# Patient Record
Sex: FEMALE | Race: WHITE | NOT HISPANIC OR LATINO | Employment: FULL TIME | URBAN - METROPOLITAN AREA
[De-identification: names, ages, dates, MRNs, and addresses within clinical notes are randomized per-mention and may not be internally consistent; named-entity substitution may affect disease eponyms.]

---

## 2019-04-16 ENCOUNTER — TRANSFERRED RECORDS (OUTPATIENT)
Dept: HEALTH INFORMATION MANAGEMENT | Facility: CLINIC | Age: 59
End: 2019-04-16

## 2021-01-25 ENCOUNTER — OFFICE VISIT (OUTPATIENT)
Dept: FAMILY MEDICINE | Facility: CLINIC | Age: 61
End: 2021-01-25
Payer: COMMERCIAL

## 2021-01-25 VITALS
DIASTOLIC BLOOD PRESSURE: 78 MMHG | BODY MASS INDEX: 29.95 KG/M2 | SYSTOLIC BLOOD PRESSURE: 115 MMHG | HEART RATE: 78 BPM | WEIGHT: 169 LBS | OXYGEN SATURATION: 93 % | HEIGHT: 63 IN

## 2021-01-25 DIAGNOSIS — Z13.1 SCREENING FOR DIABETES MELLITUS: ICD-10-CM

## 2021-01-25 DIAGNOSIS — E03.9 ACQUIRED HYPOTHYROIDISM: Primary | ICD-10-CM

## 2021-01-25 DIAGNOSIS — F51.04 PSYCHOPHYSIOLOGICAL INSOMNIA: ICD-10-CM

## 2021-01-25 DIAGNOSIS — Z12.31 ENCOUNTER FOR SCREENING MAMMOGRAM FOR BREAST CANCER: ICD-10-CM

## 2021-01-25 DIAGNOSIS — Z13.220 SCREENING FOR LIPID DISORDERS: ICD-10-CM

## 2021-01-25 DIAGNOSIS — F33.1 MODERATE EPISODE OF RECURRENT MAJOR DEPRESSIVE DISORDER (H): ICD-10-CM

## 2021-01-25 DIAGNOSIS — Z12.11 SCREEN FOR COLON CANCER: ICD-10-CM

## 2021-01-25 DIAGNOSIS — F41.1 ANXIETY STATE: ICD-10-CM

## 2021-01-25 DIAGNOSIS — R60.0 LOWER EXTREMITY EDEMA: ICD-10-CM

## 2021-01-25 DIAGNOSIS — E55.9 VITAMIN D INSUFFICIENCY: ICD-10-CM

## 2021-01-25 PROCEDURE — 99204 OFFICE O/P NEW MOD 45 MIN: CPT | Performed by: NURSE PRACTITIONER

## 2021-01-25 RX ORDER — SENNOSIDES A AND B 8.6 MG/1
1 TABLET, FILM COATED ORAL DAILY
COMMUNITY
Start: 2020-05-20 | End: 2021-01-25

## 2021-01-25 RX ORDER — LEVOTHYROXINE SODIUM 50 UG/1
50 TABLET ORAL DAILY
Qty: 90 TABLET | Refills: 0 | Status: SHIPPED | OUTPATIENT
Start: 2021-01-25 | End: 2021-04-28

## 2021-01-25 RX ORDER — TRAZODONE HYDROCHLORIDE 150 MG/1
1 TABLET ORAL AT BEDTIME
COMMUNITY
Start: 2020-08-17 | End: 2021-01-25

## 2021-01-25 RX ORDER — PAROXETINE 20 MG/1
20 TABLET, FILM COATED ORAL DAILY
Qty: 90 TABLET | Refills: 0 | Status: SHIPPED | OUTPATIENT
Start: 2021-01-25 | End: 2021-04-28

## 2021-01-25 RX ORDER — PAROXETINE 20 MG/1
1 TABLET, FILM COATED ORAL DAILY
COMMUNITY
Start: 2020-08-17 | End: 2021-01-25

## 2021-01-25 RX ORDER — TRAZODONE HYDROCHLORIDE 150 MG/1
150 TABLET ORAL AT BEDTIME
Qty: 90 TABLET | Refills: 0 | Status: SHIPPED | OUTPATIENT
Start: 2021-01-25 | End: 2021-04-28

## 2021-01-25 RX ORDER — LEVOTHYROXINE SODIUM 50 UG/1
1 TABLET ORAL DAILY
COMMUNITY
Start: 2020-11-18 | End: 2021-01-25

## 2021-01-25 ASSESSMENT — PATIENT HEALTH QUESTIONNAIRE - PHQ9: SUM OF ALL RESPONSES TO PHQ QUESTIONS 1-9: 9

## 2021-01-25 ASSESSMENT — MIFFLIN-ST. JEOR: SCORE: 1302.77

## 2021-01-25 NOTE — PATIENT INSTRUCTIONS
Refills sent on your medications today.     Please return in the next month for a physical with labs.  Try to schedule the lab visit 1-2 days before your appointment.     For lower extremity edema-    Cut back on salt in your diet.   Elevate your legs when you are sitting.   Can try knee-high compression stockings, can be purchased online or at the drug store 20-30 mmHg strength.    Some of this could be related to thyroid- we will get you back on medication and check labs next month.   If still having issues, we could do further evaluation such as an ultrasound of your heart.

## 2021-01-25 NOTE — PROGRESS NOTES
Assessment & Plan       Acquired hypothyroidism  Has been out of medication for 3 weeks, per patient.   Restart at previous dosing- 50 mcg daily.   Return in 1 month for physical, check labs at that time.   - levothyroxine (SYNTHROID/LEVOTHROID) 50 MCG tablet; Take 1 tablet (50 mcg) by mouth daily  - TSH; Future    Moderate episode of recurrent major depressive disorder (H)  Chronic, stable. Continue Paxil 20 mg.   - PARoxetine (PAXIL) 20 MG tablet; Take 1 tablet (20 mg) by mouth daily    Anxiety state  Chronic, stable. Continue Paxil 20 mg.   - PARoxetine (PAXIL) 20 MG tablet; Take 1 tablet (20 mg) by mouth daily    Psychophysiological insomnia  Chronic, stable. Continue Trazodone 150 mg.   - traZODone (DESYREL) 150 MG tablet; Take 1 tablet (150 mg) by mouth At Bedtime    Vitamin D insufficiency  History of deficiency per patient, no vitamin d level on file   - cholecalciferol 50 MCG (2000 UT) tablet; Take 1 tablet (50 mcg) by mouth daily    Lower extremity edema  Mild, no associated symptoms  No hx of CHF or liver disease  Has been out of levothyroxine, but states symptoms started prior  Discussed watching salt in diet, elevating legs, compression stockings.   -Consider further work-up if no improvement with above measures  - Comprehensive metabolic panel; Future  - TSH; Future    Screening for diabetes mellitus  - Comprehensive metabolic panel; Future    Screening for lipid disorders  - Lipid panel reflex to direct LDL Fasting; Future    Screen for colon cancer  - GASTROENTEROLOGY ADULT REF PROCEDURE ONLY; Future    Encounter for screening mammogram for breast cancer  - MA SCREENING DIGITAL BILAT - Future  (s+30); Future    Review of prior external note(s) from - CareEverywhere information from [facility]  reviewed  Review of the result(s) of each unique test - Outside labs- [facility]     Tobacco Cessation:   reports that she has been smoking cigarettes. She has a 0.75 pack-year smoking history. She  "has never used smokeless tobacco.  Tobacco Cessation Action Plan: Information offered: Patient not interested at this time    BMI:   Estimated body mass index is 30.42 kg/m  as calculated from the following:    Height as of this encounter: 1.588 m (5' 2.5\").    Weight as of this encounter: 76.7 kg (169 lb).   Weight management plan: Discussed healthy diet and exercise guidelines      See Patient Instructions  Patient Instructions   Refills sent on your medications today.     Please return in the next month for a physical with labs.  Try to schedule the lab visit 1-2 days before your appointment.     For lower extremity edema-    Cut back on salt in your diet.   Elevate your legs when you are sitting.   Can try knee-high compression stockings, can be purchased online or at the drug store 20-30 mmHg strength.    Some of this could be related to thyroid- we will get you back on medication and check labs next month.   If still having issues, we could do further evaluation such as an ultrasound of your heart.         Return in about 2 weeks (around 2/8/2021) for Physical Exam, Lab Work.    Ena Cohen, [facility]    Raysa Leary is a 60 year old who presents to clinic today for the following health issues:    HPI       New to clinic.  Needs medication refills.     Hypothyroid- Taking levothyroxine 50 mcg daily.  Ran out of medication about 3 weeks ago.  Last TSH on file is with [facility] 4/16/19= 2.68.    Depression/anxiety- well controlled on Paxil 20 mg daily.     Insomnia- well controlled on trazodone 150 mg.     Lower extremity edema- for the last few months.  Bilateral.  No pain, redness, warmth.  No SOB or CP.  No weight gain she is aware of.  No PND or orthopnea.  No hx of CHF/liver disease.         Review of Systems    ROS: 10 point ROS neg other than the symptoms noted above in the HPI.        Objective    /78   Pulse 78   Ht 1.588 m (5' 2.5\")   Wt 76.7 kg " (169 lb)   LMP 10/15/2007   SpO2 93%   BMI 30.42 kg/m    Body mass index is 30.42 kg/m .  Physical Exam   GENERAL: healthy, alert and no distress  EYES: Eyes grossly normal to inspection, PERRL and conjunctivae and sclerae normal  HENT: ear canals and TM's normal, nose and mouth without ulcers or lesions  NECK: no adenopathy, no asymmetry, masses, or scars and thyroid normal to palpation  RESP: lungs clear to auscultation - no rales, rhonchi or wheezes  CV: regular rate and rhythm, normal S1 S2, no S3 or S4, no murmur, click or rub, no peripheral edema and peripheral pulses strong  ABDOMEN: soft, nontender, no hepatosplenomegaly, no masses and bowel sounds normal  MS: no gross musculoskeletal defects noted, mild edema to BLE  SKIN: no suspicious lesions or rashes  NEURO: Normal strength and tone, mentation intact and speech normal  PSYCH: mentation appears normal, affect normal/bright    Labs reviewed in Care Everywhere

## 2021-04-28 DIAGNOSIS — F51.04 PSYCHOPHYSIOLOGICAL INSOMNIA: ICD-10-CM

## 2021-04-28 DIAGNOSIS — F33.1 MODERATE EPISODE OF RECURRENT MAJOR DEPRESSIVE DISORDER (H): ICD-10-CM

## 2021-04-28 DIAGNOSIS — F41.1 ANXIETY STATE: ICD-10-CM

## 2021-04-28 DIAGNOSIS — E03.9 ACQUIRED HYPOTHYROIDISM: ICD-10-CM

## 2021-04-28 RX ORDER — PAROXETINE 20 MG/1
TABLET, FILM COATED ORAL
Qty: 30 TABLET | Refills: 0 | Status: SHIPPED | OUTPATIENT
Start: 2021-04-28 | End: 2021-05-25

## 2021-04-28 RX ORDER — TRAZODONE HYDROCHLORIDE 150 MG/1
TABLET ORAL
Qty: 90 TABLET | Refills: 2 | Status: SHIPPED | OUTPATIENT
Start: 2021-04-28 | End: 2021-05-25

## 2021-04-28 RX ORDER — LEVOTHYROXINE SODIUM 50 UG/1
TABLET ORAL
Qty: 30 TABLET | Refills: 0 | Status: SHIPPED | OUTPATIENT
Start: 2021-04-28 | End: 2021-05-27

## 2021-04-28 NOTE — LETTER
April 28, 2021    Sapphire Vivar  [address]  [facility] [address]    Dear Sapphire,       We recently received a refill request for levothyroxine (SYNTHROID/LEVOTHROID) 50 MCG tablet, PARoxetine (PAXIL) 20 MG tablet, and traZODone (DESYREL) 150 MG tablet.  We have refilled this for a one time 30 day supply only because you are due for a:    physical office visit and fasting lab appointment      Please schedule this lab appointment 4-5 days prior to the office visit.     Please call at your earliest convenience so that there will not be a delay with your future refills.          Thank you,   Your [facility] Team/sp  [phone]

## 2021-04-28 NOTE — TELEPHONE ENCOUNTER
Refilled both for 30 days, needs physical with labs.  No further refills until scheduled. Ena Cohen, CNP

## 2021-04-28 NOTE — TELEPHONE ENCOUNTER
"Requested Prescriptions   Pending Prescriptions Disp Refills    levothyroxine (SYNTHROID/LEVOTHROID) 50 MCG tablet [Pharmacy Med Name: Levothyroxine Sodium Oral Tablet 50 MCG] 90 tablet 0     Sig: TAKE ONE TABLET BY MOUTH ONE TIME DAILY       Thyroid Protocol Failed - 4/28/2021  3:21 PM        Failed - Normal TSH on file in past 12 months     No lab results found.           Passed - Patient is 12 years or older        Passed - Recent (12 mo) or future (30 days) visit within the authorizing provider's specialty     Patient has had an office visit with the authorizing provider or a provider within the authorizing providers department within the previous 12 mos or has a future within next 30 days. See \"Patient Info\" tab in inbasket, or \"Choose Columns\" in Meds & Orders section of the refill encounter.              Passed - Medication is active on med list        Passed - No active pregnancy on record     If patient is pregnant or has had a positive pregnancy test, please check TSH.          Passed - No positive pregnancy test in past 12 months     If patient is pregnant or has had a positive pregnancy test, please check TSH.            PARoxetine (PAXIL) 20 MG tablet [Pharmacy Med Name: PARoxetine HCl Oral Tablet 20 MG] 90 tablet 0     Sig: TAKE ONE TABLET BY MOUTH ONE TIME DAILY       SSRIs Protocol Failed - 4/28/2021  3:21 PM        Failed - PHQ-9 score less than 5 in past 6 months     Please review last PHQ-9 score.           Passed - Medication is active on med list        Passed - Patient is age 18 or older        Passed - No active pregnancy on record        Passed - No positive pregnancy test in last 12 months        Passed - Recent (6 mo) or future (30 days) visit within the authorizing provider's specialty     Patient had office visit in the last 6 months or has a visit in the next 30 days with authorizing provider or within the authorizing provider's specialty.  See \"Patient Info\" tab in inbasket, or \"Choose " "Columns\" in Meds & Orders section of the refill encounter.             Signed Prescriptions Disp Refills    traZODone (DESYREL) 150 MG tablet 90 tablet 2     Sig: TAKE ONE TABLET BY MOUTH AT BEDTIME       Serotonin Modulators Passed - 4/28/2021  3:21 PM        Passed - Recent (12 mo) or future (30 days) visit within the authorizing provider's specialty     Patient has had an office visit with the authorizing provider or a provider within the authorizing providers department within the previous 12 mos or has a future within next 30 days. See \"Patient Info\" tab in inbasket, or \"Choose Columns\" in Meds & Orders section of the refill encounter.              Passed - Medication is active on med list        Passed - Patient is age 18 or older        Passed - No active pregnancy on record        Passed - No positive pregnancy test in past 12 months             "

## 2021-05-25 ENCOUNTER — ANCILLARY PROCEDURE (OUTPATIENT)
Dept: GENERAL RADIOLOGY | Facility: CLINIC | Age: 61
End: 2021-05-25
Attending: NURSE PRACTITIONER
Payer: COMMERCIAL

## 2021-05-25 ENCOUNTER — OFFICE VISIT (OUTPATIENT)
Dept: FAMILY MEDICINE | Facility: CLINIC | Age: 61
End: 2021-05-25
Payer: COMMERCIAL

## 2021-05-25 VITALS
DIASTOLIC BLOOD PRESSURE: 70 MMHG | SYSTOLIC BLOOD PRESSURE: 110 MMHG | HEIGHT: 63 IN | WEIGHT: 171 LBS | BODY MASS INDEX: 30.3 KG/M2 | HEART RATE: 79 BPM | TEMPERATURE: 97.2 F | OXYGEN SATURATION: 95 %

## 2021-05-25 DIAGNOSIS — R60.0 BILATERAL LOWER EXTREMITY EDEMA: ICD-10-CM

## 2021-05-25 DIAGNOSIS — L98.9 LESION OF SKIN OF NOSE: ICD-10-CM

## 2021-05-25 DIAGNOSIS — E78.5 DYSLIPIDEMIA: ICD-10-CM

## 2021-05-25 DIAGNOSIS — F51.04 PSYCHOPHYSIOLOGICAL INSOMNIA: ICD-10-CM

## 2021-05-25 DIAGNOSIS — F33.1 MODERATE EPISODE OF RECURRENT MAJOR DEPRESSIVE DISORDER (H): ICD-10-CM

## 2021-05-25 DIAGNOSIS — Z00.00 ROUTINE GENERAL MEDICAL EXAMINATION AT A HEALTH CARE FACILITY: Primary | ICD-10-CM

## 2021-05-25 DIAGNOSIS — F41.1 ANXIETY STATE: ICD-10-CM

## 2021-05-25 DIAGNOSIS — Z13.1 SCREENING FOR DIABETES MELLITUS: ICD-10-CM

## 2021-05-25 DIAGNOSIS — R06.02 SHORTNESS OF BREATH: ICD-10-CM

## 2021-05-25 DIAGNOSIS — F17.200 NICOTINE DEPENDENCE, UNCOMPLICATED, UNSPECIFIED NICOTINE PRODUCT TYPE: ICD-10-CM

## 2021-05-25 DIAGNOSIS — E03.9 ACQUIRED HYPOTHYROIDISM: ICD-10-CM

## 2021-05-25 DIAGNOSIS — Z23 NEED FOR VACCINATION: ICD-10-CM

## 2021-05-25 LAB
ALBUMIN SERPL-MCNC: 3.9 G/DL (ref 3.4–5)
ALP SERPL-CCNC: 84 U/L (ref 40–150)
ALT SERPL W P-5'-P-CCNC: 29 U/L (ref 0–50)
ANION GAP SERPL CALCULATED.3IONS-SCNC: 5 MMOL/L (ref 3–14)
AST SERPL W P-5'-P-CCNC: 19 U/L (ref 0–45)
BILIRUB SERPL-MCNC: 0.3 MG/DL (ref 0.2–1.3)
BUN SERPL-MCNC: 10 MG/DL (ref 7–30)
CALCIUM SERPL-MCNC: 9.2 MG/DL (ref 8.5–10.1)
CHLORIDE SERPL-SCNC: 105 MMOL/L (ref 94–109)
CHOLEST SERPL-MCNC: 214 MG/DL
CO2 SERPL-SCNC: 26 MMOL/L (ref 20–32)
CREAT SERPL-MCNC: 0.95 MG/DL (ref 0.52–1.04)
GFR SERPL CREATININE-BSD FRML MDRD: 65 ML/MIN/{1.73_M2}
GLUCOSE SERPL-MCNC: 112 MG/DL (ref 70–99)
HDLC SERPL-MCNC: 31 MG/DL
LDLC SERPL CALC-MCNC: 145 MG/DL
NONHDLC SERPL-MCNC: 183 MG/DL
NT-PROBNP SERPL-MCNC: 28 PG/ML (ref 0–125)
POTASSIUM SERPL-SCNC: 5.9 MMOL/L (ref 3.4–5.3)
PROT SERPL-MCNC: 7.6 G/DL (ref 6.8–8.8)
SODIUM SERPL-SCNC: 136 MMOL/L (ref 133–144)
TRIGL SERPL-MCNC: 189 MG/DL
TSH SERPL DL<=0.005 MIU/L-ACNC: 2.26 MU/L (ref 0.4–4)

## 2021-05-25 PROCEDURE — 84443 ASSAY THYROID STIM HORMONE: CPT | Performed by: NURSE PRACTITIONER

## 2021-05-25 PROCEDURE — 99396 PREV VISIT EST AGE 40-64: CPT | Mod: 25 | Performed by: NURSE PRACTITIONER

## 2021-05-25 PROCEDURE — 90715 TDAP VACCINE 7 YRS/> IM: CPT | Performed by: NURSE PRACTITIONER

## 2021-05-25 PROCEDURE — 90471 IMMUNIZATION ADMIN: CPT | Performed by: NURSE PRACTITIONER

## 2021-05-25 PROCEDURE — 71046 X-RAY EXAM CHEST 2 VIEWS: CPT | Performed by: RADIOLOGY

## 2021-05-25 PROCEDURE — 99214 OFFICE O/P EST MOD 30 MIN: CPT | Mod: 25 | Performed by: NURSE PRACTITIONER

## 2021-05-25 PROCEDURE — 83880 ASSAY OF NATRIURETIC PEPTIDE: CPT | Performed by: NURSE PRACTITIONER

## 2021-05-25 PROCEDURE — 80061 LIPID PANEL: CPT | Performed by: NURSE PRACTITIONER

## 2021-05-25 PROCEDURE — 36415 COLL VENOUS BLD VENIPUNCTURE: CPT | Performed by: NURSE PRACTITIONER

## 2021-05-25 PROCEDURE — 80053 COMPREHEN METABOLIC PANEL: CPT | Performed by: NURSE PRACTITIONER

## 2021-05-25 RX ORDER — BUPROPION HYDROCHLORIDE 150 MG/1
150 TABLET, FILM COATED, EXTENDED RELEASE ORAL 2 TIMES DAILY
Qty: 60 TABLET | Refills: 2 | Status: SHIPPED | OUTPATIENT
Start: 2021-06-24 | End: 2022-06-27

## 2021-05-25 RX ORDER — PAROXETINE 20 MG/1
20 TABLET, FILM COATED ORAL DAILY
Qty: 90 TABLET | Refills: 3 | Status: SHIPPED | OUTPATIENT
Start: 2021-05-25 | End: 2022-06-27

## 2021-05-25 RX ORDER — TRAZODONE HYDROCHLORIDE 150 MG/1
TABLET ORAL
Qty: 90 TABLET | Refills: 3 | Status: SHIPPED | OUTPATIENT
Start: 2021-05-25 | End: 2022-06-27

## 2021-05-25 RX ORDER — BUPROPION HYDROCHLORIDE 150 MG/1
TABLET, FILM COATED, EXTENDED RELEASE ORAL
Qty: 57 TABLET | Refills: 0 | Status: SHIPPED | OUTPATIENT
Start: 2021-05-25 | End: 2021-06-02

## 2021-05-25 ASSESSMENT — ENCOUNTER SYMPTOMS
JOINT SWELLING: 1
HEARTBURN: 1
CONSTIPATION: 1
ARTHRALGIAS: 1
BREAST MASS: 0

## 2021-05-25 ASSESSMENT — MIFFLIN-ST. JEOR: SCORE: 1311.84

## 2021-05-25 NOTE — PATIENT INSTRUCTIONS
Call to schedule colonoscopy. Phone: [phone]  Keep appointment for mammogram.   Labs today- I will get back to you with results.   If normal, swelling is likely related to venous insufficiency.  We could check an ultrasound to confirm if you are interested.  I would recommend starting compression stockings, knee high, 20-30 mmHG strength.  Can buy online.  Elevate your legs when sitting.     Chest xray due to cough/shortness of breath.   Start Zyban for smoking cessation.         Preventive Health Recommendations  Female Ages 50 - 64    Yearly exam: See your health care provider every year in order to  o Review health changes.   o Discuss preventive care.    o Review your medicines if your doctor has prescribed any.      Get a Pap test every three years (unless you have an abnormal result and your provider advises testing more often).    If you get Pap tests with HPV test, you only need to test every 5 years, unless you have an abnormal result.     You do not need a Pap test if your uterus was removed (hysterectomy) and you have not had cancer.    You should be tested each year for STDs (sexually transmitted diseases) if you're at risk.     Have a mammogram every 1 to 2 years.    Have a colonoscopy at age 50, or have a yearly FIT test (stool test). These exams screen for colon cancer.      Have a cholesterol test every 5 years, or more often if advised.    Have a diabetes test (fasting glucose) every three years. If you are at risk for diabetes, you should have this test more often.     If you are at risk for osteoporosis (brittle bone disease), think about having a bone density scan (DEXA).    Shots: Get a flu shot each year. Get a tetanus shot every 10 years.    Nutrition:     Eat at least 5 servings of fruits and vegetables each day.    Eat whole-grain bread, whole-wheat pasta and brown rice instead of white grains and rice.    Get adequate Calcium and Vitamin D.     Lifestyle    Exercise at least 150  minutes a week (30 minutes a day, 5 days a week). This will help you control your weight and prevent disease.    Limit alcohol to one drink per day.    No smoking.     Wear sunscreen to prevent skin cancer.     See your dentist every six months for an exam and cleaning.    See your eye doctor every 1 to 2 years.    Zyban / Wellbutrin    Dosing:    Before Your Quit Date: Dose   Days 1-3 Take 150 mg by mouth once daily in the morning.   Days 4-7 (or up to 4-14 days) Take 150 mg by mouth twice daily in the morning and evening.   After Your Quit Date:    Treatment usually continues 7-12 weeks Take 150 mg by mouth twice daily in the morning and evening.       Some tips:    You will start taking bupropion at least 1 week before quitting smoking. It is okay to smoke while using bupropion.     You can use nicotine replacement therapy such as nicotine gum while using  Bupropion.     Take your 2 daily doses at least 8 hours apart.     DO NOT CRUSH OR BREAK TABLETS. Swallow the tablet whole.     You can take your medication with or without food.     Do not drink alcohol while taking this medication.     Side Effects:    Some people may experience dry mouth and insomnia. These may resolve in time.     Small frequent meals, frequent mouth care, chewing gum, or sucking lozenges may help with dry mouth.     Other possible rare side effects include constipation, nausea, headache, drowsiness, and weight loss may occur.     If you experience any other side effects that are troublesome, or if you feel something is not right, call your health care professional.         HOW TO QUIT SMOKING  Smoking is one of the hardest habits to break. About half of all those who have ever smoked have been able to quit, and most of those (about 70%) who still smoke want to quit. Here are some of the best ways to stop smoking.     KEEP TRYING:  It takes most smokers about 8 tries before they are finally able to fully quit. So, the more often you try and  fail, the better your chance of quitting the next time! So, don't give up!    GO COLD TURKEY:  Most ex-smokers quit cold turkey. Trying to cut back gradually doesn't seem to work as well, perhaps because it continues the smoking habit. Also, it is possible to fool yourself by inhaling more while smoking fewer cigarettes. This results in the same amount of nicotine in your body!    GET SUPPORT:  Support programs can make an important difference, especially for the heavy smoker. These groups offer lectures, methods to change your behavior and peer support. Call the free national Quitline for more information. 800-QUIT-NOW (769-151-8956). Low-cost or free programs are offered by many hospitals, local chapters of the American Lung Association (454-782-2165) and the American Cancer Society (600-458-8618). Support at home is important too. Non-smokers can help by offering praise and encouragement. If the smoker fails to quit, encourage them to try again!    OVER-THE-COUNTER MEDICINES:  For those who can't quit on their own, Nicotine Replacement Therapy (NRT) may make quitting much easier. Certain aids such as the nicotine patch, gum and lozenge are available without a prescription. However, it is best to use these under the guidance of your doctor. The skin patch provides a steady supply of nicotine to the body. Nicotine gum and lozenge gives temporary bursts of low levels of nicotine. Both methods take the edge off the craving for cigarettes. WARNING: If you feel symptoms of nicotine overdose, such as nausea, vomiting, dizziness, weakness, or fast heartbeat, stop using these and see your doctor.    PRESCRIPTION MEDICINES:  After evaluating your smoking patterns and prior attempts at quitting, your doctor may offer a prescription medicine such as bupropion (Zyban, Wellbutrin), varenicline (Chantix, Champix), a niocotine inhaler or nasal spray. Each has its unique advantage and side effects which your doctor can review  with you.    HEALTH BENEFITS OF QUITTING:  The benefits of quitting start right away and keep improving the longer you go without smokin minutes: blood pressure and pulse return to normal  8 hours: oxygen levels return to normal  2 days: ability to smell and taste begins to improve as damaged nerves start to regrow  2-3 weeks: circulation and lung function improves  1-9 months: decreased cough, congestion and shortness of breath; less tired  1 year: risk of heart attack decreases by half  5 years: risk of lung cancer decreases by half; risk of stroke becomes the same as a non-smoker  For information about how to quit smoking, visit the following links:  National Cancer [place] ,   Clearing the Air, Quit Smoking Today   - an online booklet. http://www.smokefree.gov/pubs/clearing_the_air.pdf  Smokefree.gov http://smokefree.gov/  QuitNet http://www.quitnet.com/    1219-3146 [facility], [address] [place] [address], [place] [address]. All rights reserved. This information is not intended as a substitute for professional medical care. Always follow your healthcare professional's instructions.

## 2021-05-25 NOTE — LETTER
My Depression Action Plan  Name: Sapphire Vivar   Date of Birth 1960  Date: 5/25/2021    My doctor: No Ref-Primary, Physician   My clinic: [facility]  [address] [facility] [address] [phone]          GREEN    ZONE   Good Control    What it looks like:     Things are going generally well. You have normal ups and downs. You may even feel depressed from time to time, but bad moods usually last less than a day.   What you need to do:  1. Continue to care for yourself (see self care plan)  2. Check your depression survival kit and update it as needed  3. Follow your physician s recommendations including any medication.  4. Do not stop taking medication unless you consult with your physician first.           YELLOW         ZONE Getting Worse    What it looks like:     Depression is starting to interfere with your life.     It may be hard to get out of bed; you may be starting to isolate yourself from others.    Symptoms of depression are starting to last most all day and this has happened for several days.     You may have suicidal thoughts but they are not constant.   What you need to do:     1. Call your care team. Your response to treatment will improve if you keep your care team informed of your progress. Yellow periods are signs an adjustment may need to be made.     2. Continue your self-care.  Just get dressed and ready for the day.  Don't give yourself time to talk yourself out of it.    3. Talk to someone in your support network.    4. Open up your Depression Self-Care Plan/Wellness Kit.           RED    ZONE Medical Alert - Get Help    What it looks like:     Depression is seriously interfering with your life.     You may experience these or other symptoms: You can t get out of bed most days, can t work or engage in other necessary activities, you have trouble taking care of basic hygiene, or basic responsibilities, thoughts of suicide or death that will  not go away, self-injurious behavior.     What you need to do:  1. Call your care team and request a same-day appointment. If they are not available (weekends or after hours) call your local crisis line, emergency room or 911.          Depression Self-Care Plan / Wellness Kit    Many people find that medication and therapy are helpful treatments for managing depression. In addition, making small changes to your everyday life can help to boost your mood and improve your wellbeing. Below are some tips for you to consider. Be sure to talk with your medical provider and/or behavioral health consultant if your symptoms are worsening or not improving.     Sleep   Sleep hygiene  means all of the habits that support good, restful sleep. It includes maintaining a consistent bedtime and wake time, using your bedroom only for sleeping or sex, and keeping the bedroom dark and free of distractions like a computer, smartphone, or television.     Develop a Healthy Routine  Maintain good hygiene. Get out of bed in the morning, make your bed, brush your teeth, take a shower, and get dressed. Don t spend too much time viewing media that makes you feel stressed. Find time to relax each day.    Exercise  Get some form of exercise every day. This will help reduce pain and release endorphins, the  feel good  chemicals in your brain. It can be as simple as just going for a walk or doing some gardening, anything that will get you moving.      Diet  Strive to eat healthy foods, including fruits and vegetables. Drink plenty of water. Avoid excessive sugar, caffeine, alcohol, and other mood-altering substances.     Stay Connected with Others  Stay in touch with friends and family members.    Manage Your Mood  Try deep breathing, massage therapy, biofeedback, or meditation. Take part in fun activities when you can. Try to find something to smile about each day.     Psychotherapy  Be open to working with a therapist if your provider recommends  it.     Medication  Be sure to take your medication as prescribed. Most anti-depressants need to be taken every day. It usually takes several weeks for medications to work. Not all medicines work for all people. It is important to follow-up with your provider to make sure you have a treatment plan that is working for you. Do not stop your medication abruptly without first discussing it with your provider.    Crisis Resources   These hotlines are for both adults and children. They and are open 24 hours a day, 7 days a week unless noted otherwise.      National Suicide Prevention Lifeline   2-067-501-TALK (8721)      Crisis Text Line    www.crisistextline.org  Text HOME to 326024 from anywhere in the [place], anytime, about any type of crisis. A live, trained crisis counselor will receive the text and respond quickly.      [employer] Lifeline for LGBTQ Youth  A national crisis intervention and suicide lifeline for LGBTQ youth under 25. Provides a safe place to talk without judgement. Call 1-293.406.6005; text START to 224505 or visit www.thetrevorproject.org to talk to a trained counselor.      For [facility] crisis numbers, visit the [facility] website at:  https://[place].gov/dhs/people-we-serve/adults/health-care/mental-health/resources/crisis-contacts.jsp

## 2021-05-25 NOTE — PROGRESS NOTES
SUBJECTIVE:   CC: Sapphire Vivar is an 60 year old woman who presents for preventive health visit.     Smoking- estimated 20 year pack history.  Currently smokes 1-2 ppd.  Would like to try quitting.  Reports chronic cough and some shortness of breath with activity, denies hx of COPD.  Denies recent fever, chills, chest pain.     Lesion on nose- present for a few months, won't heal. Sometimes it bleeds.  Would like referral to dermatology.     Lower extremity edema- bilateral and equal, present for several months. No redness, warmth or pain. No hx of CHF or liver disease.  We discussed this at visit in January 2021, at that time she wanted to wait for labs.  Discussed watching salt in diet, compression stocking, elevating legs.  She doesn't eat a lot of salt, doesn't think that is an issue.  Hasn't tried compression stockings.  Wants to know what is causing the issue.     Depression/anxiety- stable on Paxil 20 mg daily.      Hypothyroidism- restarted medication in January 2021, levothyroxine 50 mcg daily.  Had been out of medication for several weeks at that time.  Will check labs today.      Insomnia- stable on trazodone 150 mg.     Scheduled for mammogram next month.     Pap not indicated- hx of hysterectomy for benign reason.     Needs to schedule colonoscopy, last completed in 2010.   {    Patient has been advised of split billing requirements and indicates understanding: Yes  Healthy Habits:     Getting at least 3 servings of Calcium per day:  NO    Bi-annual eye exam:  Yes    Dental care twice a year:  Yes    Sleep apnea or symptoms of sleep apnea:  Daytime drowsiness    Diet:  Regular (no restrictions)    Frequency of exercise:  None    Taking medications regularly:  Yes    Medication side effects:  Other    PHQ-2 Total Score: 0    Additional concerns today:  Yes      Today's PHQ-2 Score:   PHQ-2 ( 1999 Pfizer) 5/25/2021   Q1: Little interest or pleasure in doing things 0   Q2: Feeling down, depressed or  hopeless 0   PHQ-2 Score 0   Q1: Little interest or pleasure in doing things Not at all   Q2: Feeling down, depressed or hopeless Not at all   PHQ-2 Score 0       Abuse: Current or Past (Physical, Sexual or Emotional) - No  Do you feel safe in your environment? Yes        Social History     Tobacco Use     Smoking status: Current Every Day Smoker     Packs/day: 1.00     Years: 20.00     Pack years: 20.00     Types: Cigarettes     Smokeless tobacco: Never Used     Tobacco comment: patient wuit for 27 years and started again    Substance Use Topics     Alcohol use: No     If you drink alcohol do you typically have >3 drinks per day or >7 drinks per week? No    Alcohol Use 5/25/2021   Prescreen: >3 drinks/day or >7 drinks/week? Not Applicable   Prescreen: >3 drinks/day or >7 drinks/week? -       Reviewed orders with patient.  Reviewed health maintenance and updated orders accordingly - Yes      Breast Cancer Screening:    Breast CA Risk Assessment (FHS-7) 5/25/2021   Do you have a family history of breast, colon, or ovarian cancer? No / Unknown         Mammogram Screening: Recommended mammography every 1-2 years with patient discussion and risk factor consideration  Pertinent mammograms are reviewed under the imaging tab.    History of abnormal Pap smear: Status post benign hysterectomy. Health Maintenance and Surgical History updated.     Reviewed and updated as needed this visit by clinical staff  Tobacco  Allergies  Meds  Problems  Med Hx  Surg Hx  Fam Hx          Reviewed and updated as needed this visit by Provider  Tobacco  Allergies  Meds  Problems  Med Hx  Surg Hx  Fam Hx             Review of Systems   Cardiovascular: Positive for peripheral edema.   Gastrointestinal: Positive for constipation and heartburn.   Breasts:  Negative for tenderness, breast mass and discharge.   Genitourinary: Negative for pelvic pain, vaginal bleeding and vaginal discharge.   Musculoskeletal: Positive for arthralgias  "and joint swelling.        OBJECTIVE:   /70   Pulse 79   Temp 97.2  F (36.2  C)   Ht 1.588 m (5' 2.5\")   Wt 77.6 kg (171 lb)   LMP 10/15/2007   SpO2 95%   BMI 30.78 kg/m    Physical Exam  GENERAL: healthy, alert and no distress  EYES: Eyes grossly normal to inspection, PERRL and conjunctivae and sclerae normal  HENT: ear canals and TM's normal, small skin colored crusty lesion left nose, mouth without ulcers or lesions  NECK: no adenopathy, no asymmetry, masses, or scars and thyroid normal to palpation  RESP: lungs clear to auscultation - no rales, rhonchi or wheezes  BREAST: normal without masses, tenderness or nipple discharge and no palpable axillary masses or adenopathy  CV: regular rate and rhythm, normal S1 S2, no S3 or S4, no murmur, click or rub, no peripheral edema and peripheral pulses strong  ABDOMEN: soft, nontender, no hepatosplenomegaly, no masses and bowel sounds normal  MS: no gross musculoskeletal defects noted, no edema  SKIN: no suspicious lesions or rashes  NEURO: Normal strength and tone, mentation intact and speech normal  PSYCH: mentation appears normal, affect normal/bright    Diagnostic Test Results:  Labs reviewed in Epic    ASSESSMENT/PLAN:   1. Routine general medical examination at a health care facility  Continue annual exams.     2. Acquired hypothyroidism  Check labs, refill on levothyroxine pending results.   - TSH with free T4 reflex    3. Dyslipidemia  Chronic, not treated.  Last labs 2019, [facility].   Check labs today, patient is fasting.   - Lipid panel reflex to direct LDL Fasting    4. Bilateral lower extremity edema  I think likely venous insufficiency.   Check labs.   Discussed trying knee high compression stockings, elevating legs when sitting, low salt.   Discussed could check venous insufficiency US.   - TSH with free T4 reflex  - Comprehensive metabolic panel  - XR Chest 2 Views  - BNP-N terminal pro    5. Shortness of breath  Smoker.  Check CXR.  " "Further work-up pending results.  She plans to work on smoking cessation.   - XR Chest 2 Views  - BNP-N terminal pro    6. Nicotine dependence, uncomplicated, unspecified nicotine product type  Discussed trial of bupropion.  Has been on Wellbutrin in the past for depression (at a time she was not smoking) and tolerated without side effects.   - buPROPion (ZYBAN) 150 MG 12 hr tablet; Take 1 tablet (150 mg) by mouth daily for 3 days, THEN 1 tablet (150 mg) 2 times daily for 27 days.  Dispense: 57 tablet; Refill: 0  - buPROPion (ZYBAN) 150 MG 12 hr tablet; Take 1 tablet (150 mg) by mouth 2 times daily  Dispense: 60 tablet; Refill: 2  - XR Chest 2 Views    7. Lesion of skin of nose  Referral to dermatology.   - DERMATOLOGY ADULT REFERRAL; Future    8. Psychophysiological insomnia  Chronic, stable.  Continue Trazodone.   - traZODone (DESYREL) 150 MG tablet; TAKE ONE TABLET BY MOUTH AT BEDTIME  Dispense: 90 tablet; Refill: 3    9. Moderate episode of recurrent major depressive disorder (H)  Chronic, stable.  Continue Paxil.   - PARoxetine (PAXIL) 20 MG tablet; Take 1 tablet (20 mg) by mouth daily  Dispense: 90 tablet; Refill: 3    10. Anxiety state  Chronic, stable. Continue Paxil.   - PARoxetine (PAXIL) 20 MG tablet; Take 1 tablet (20 mg) by mouth daily  Dispense: 90 tablet; Refill: 3    11. Screening for diabetes mellitus  - Comprehensive metabolic panel    12. Need for vaccination  - TDAP VACCINE (Adacel, Boostrix)  [[id number]]    Patient has been advised of split billing requirements and indicates understanding: Yes  COUNSELING:  Reviewed preventive health counseling, as reflected in patient instructions    Estimated body mass index is 30.78 kg/m  as calculated from the following:    Height as of this encounter: 1.588 m (5' 2.5\").    Weight as of this encounter: 77.6 kg (171 lb).    Weight management plan: Discussed healthy diet and exercise guidelines    She reports that she has been smoking cigarettes. She has a 20.00 " pack-year smoking history. She has never used smokeless tobacco.  Tobacco Cessation Action Plan:   Pharmacotherapies : Zyban/Wellbutrin      Counseling Resources:  ATP IV Guidelines  Pooled Cohorts Equation Calculator  Breast Cancer Risk Calculator  BRCA-Related Cancer Risk Assessment: FHS-7 Tool  FRAX Risk Assessment  ICSI Preventive Guidelines  Dietary Guidelines for Americans, 2010  USDA's MyPlate  ASA Prophylaxis  Lung CA Screening    Ena Cohen CNP  [facility]

## 2021-05-25 NOTE — LETTER
May 27, 2021      Sapphire Vivar  [address]  [place] [address]        Marino Leary,     I tried calling you a few times and was unable to reach you because your voicemail box is full.   Here are your lab results.     TSH is normal- continue levothyroxine at current dose.   I sent a refill.     Your lipid panel shows that you would benefit from a cholesterol lowering medication to reduce the risk of heart attack and stroke.  I sent in a prescription for Lipitor (atorvastatin) 10 mg once daily.  Take this at bedtime.  This is a low dose, people usually tolerate very well, but let me know if you experience side effects.  We can recheck your lipid panel next year.     Your kidney function and liver look good.   Your potassium is elevated.  Is it possible that you were dehydrated or taking a lot of ibuprofen recently (or other NSAID medications, such as Aleve)?  That can cause the potassium to go up.  I'd like to recheck you level in 2 weeks- make sure you are very well hydrated at the time of the lab draw.  Make an appointment with the lab only.   If you have been taking a lot of anti-inflammatories, please cut back.  If still elevated, we may need to try methods to lower the level.       No lab results to explain the swelling in the legs.  The lab I checked for signs of heart failure was normal.   Like we discussed, it could be related to the veins in your legs.  Another cause is that it could be related to medication.  Trazodone can have the potential to cause edema- you could try stopping for a few weeks to see if you have any improvement.  Another thought is to try the compression stockings as we discussed.     Your chest xray was normal.  Work on smoking cessation.  If shortness of breath continues, we could look at further testing for issues such as COPD or related to your heart.  Please keep me updated.     Please let me know if you have any questions or concerns.  Sorry I was not able to get a hold of  elijah.     Ena Cohen, CNP    Resulted Orders   TSH with free T4 reflex   Result Value Ref Range    TSH 2.26 0.40 - 4.00 mU/L   Comprehensive metabolic panel   Result Value Ref Range    Sodium 136 133 - 144 mmol/L    Potassium 5.9 (H) 3.4 - 5.3 mmol/L    Chloride 105 94 - 109 mmol/L    Carbon Dioxide 26 20 - 32 mmol/L    Anion Gap 5 3 - 14 mmol/L    Glucose 112 (H) 70 - 99 mg/dL      Comment:      Non Fasting    Urea Nitrogen 10 7 - 30 mg/dL    Creatinine 0.95 0.52 - 1.04 mg/dL    GFR Estimate 65 >60 mL/min/[1.73_m2]      Comment:      Non  GFR Calc  Starting 12/18/2018, serum creatinine based estimated GFR (eGFR) will be   calculated using the Chronic Kidney Disease Epidemiology Collaboration   (CKD-EPI) equation.      GFR Estimate If Black 75 >60 mL/min/[1.73_m2]      Comment:       GFR Calc  Starting 12/18/2018, serum creatinine based estimated GFR (eGFR) will be   calculated using the Chronic Kidney Disease Epidemiology Collaboration   (CKD-EPI) equation.      Calcium 9.2 8.5 - 10.1 mg/dL    Bilirubin Total 0.3 0.2 - 1.3 mg/dL    Albumin 3.9 3.4 - 5.0 g/dL    Protein Total 7.6 6.8 - 8.8 g/dL    Alkaline Phosphatase 84 40 - 150 U/L    ALT 29 0 - 50 U/L    AST 19 0 - 45 U/L   Lipid panel reflex to direct LDL Fasting   Result Value Ref Range    Cholesterol 214 (H) <200 mg/dL      Comment:      Desirable:       <200 mg/dl    Triglycerides 189 (H) <150 mg/dL      Comment:      Borderline high:  150-199 mg/dl  High:             200-499 mg/dl  Very high:       >499 mg/dl  Non Fasting      HDL Cholesterol 31 (L) >49 mg/dL    LDL Cholesterol Calculated 145 (H) <100 mg/dL      Comment:      Above desirable:  100-129 mg/dl  Borderline High:  130-159 mg/dL  High:             160-189 mg/dL  Very high:       >189 mg/dl      Non HDL Cholesterol 183 (H) <130 mg/dL      Comment:      Above Desirable:  130-159 mg/dl  Borderline high:  160-189 mg/dl  High:             190-219 mg/dl  Very high:        >219 mg/dl     BNP-N terminal pro   Result Value Ref Range    N-Terminal Pro Bnp 28 0 - 125 pg/mL      Comment:         Reference range shown and results flagged as abnormal are for the outpatient,   non acute settings. Establishing a baseline value for each individual patient   is useful for follow-up.  Suggested inpatient cut points for confirming diagnosis of CHF in an acute   setting are:   >450 pg/mL (age 18 to less than 50)   >900 pg/mL (age 50 to less than 75)   >1800 pg/mL (75 yrs and older)  An inpatient or emergency department NT-proPBNP <300 pg/mL effectively rules   out acute CHF, with 99% negative predictive value.          If you have any questions or concerns, please call the clinic at the number listed above.       Sincerely,      Ena Cohen, CNP

## 2021-05-26 ENCOUNTER — TELEPHONE (OUTPATIENT)
Dept: INTERNAL MEDICINE | Facility: CLINIC | Age: 61
End: 2021-05-26

## 2021-05-26 DIAGNOSIS — E78.5 DYSLIPIDEMIA: Primary | ICD-10-CM

## 2021-05-26 DIAGNOSIS — E03.9 ACQUIRED HYPOTHYROIDISM: ICD-10-CM

## 2021-05-26 DIAGNOSIS — E87.5 HYPERKALEMIA: ICD-10-CM

## 2021-05-27 RX ORDER — LEVOTHYROXINE SODIUM 50 UG/1
50 TABLET ORAL DAILY
Qty: 90 TABLET | Refills: 3 | Status: SHIPPED | OUTPATIENT
Start: 2021-05-27 | End: 2022-05-24

## 2021-05-27 RX ORDER — ATORVASTATIN CALCIUM 10 MG/1
10 TABLET, FILM COATED ORAL DAILY
Qty: 90 TABLET | Refills: 3 | Status: SHIPPED | OUTPATIENT
Start: 2021-05-27 | End: 2022-06-27

## 2021-05-27 NOTE — TELEPHONE ENCOUNTER
Tried calling patient twice on separate days- no answer and voicemail box full. Will send letter regarding lab results. Ena Cohen, CNP

## 2021-05-28 ENCOUNTER — HOSPITAL ENCOUNTER (OUTPATIENT)
Facility: AMBULATORY SURGERY CENTER | Age: 61
End: 2021-05-28
Attending: SURGERY
Payer: COMMERCIAL

## 2021-05-28 DIAGNOSIS — Z11.59 ENCOUNTER FOR SCREENING FOR OTHER VIRAL DISEASES: ICD-10-CM

## 2021-05-28 DIAGNOSIS — Z12.11 SCREEN FOR COLON CANCER: Primary | ICD-10-CM

## 2021-06-02 ENCOUNTER — NURSE TRIAGE (OUTPATIENT)
Dept: NURSING | Facility: CLINIC | Age: 61
End: 2021-06-02

## 2021-06-02 DIAGNOSIS — F17.200 NICOTINE DEPENDENCE, UNCOMPLICATED, UNSPECIFIED NICOTINE PRODUCT TYPE: ICD-10-CM

## 2021-06-02 RX ORDER — BUPROPION HYDROCHLORIDE 150 MG/1
TABLET, FILM COATED, EXTENDED RELEASE ORAL
Qty: 57 TABLET | Refills: 0 | Status: SHIPPED | OUTPATIENT
Start: 2021-06-02 | End: 2021-07-02

## 2021-06-02 NOTE — TELEPHONE ENCOUNTER
1. Patient calling stating she did not receive her Wellbutrin from the pharmacy.   Stating the pharmacy is stating she picked it up.  Patient reporting she received 3 prescriptions synthroid, Paxil, and Lipitor.   Placed call to pharmacy who advised they sold patient 4 medications 6/1/21. Stating they would need a new prescription and would need to contact insurance.     Resent prescription from 5/24/21.      2. Patient is requesting to know why she is on Lipitor?    Reviewed lab notes from 5/27/21 with patient.     Patient agrees to scheduled follow up K+ lab only appointment in 2 weeks.     Caller verbalized understanding. Denies further questions.    Mariaelena Ward RN  [facility]      Additional Information    Negative: Drug overdose and triager unable to answer question    Negative: Caller requesting information unrelated to medicine    Negative: Caller requesting a prescription for Strep throat and has a positive culture result    Negative: Rash while taking a medication or within 3 days of stopping it    Negative: Immunization reaction suspected    Negative: Asthma and having symptoms of asthma (cough, wheezing, etc.)    Negative: Breastfeeding questions about mother's medicines and diet    Negative: MORE THAN A DOUBLE DOSE of a prescription or over-the-counter (OTC) drug    Negative: DOUBLE DOSE (an extra dose or lesser amount) of over-the-counter (OTC) drug and any symptoms (e.g., dizziness, nausea, pain, sleepiness)    Negative: DOUBLE DOSE (an extra dose or lesser amount) of prescription drug and any symptoms (e.g., dizziness, nausea, pain, sleepiness)    Negative: Took another person's prescription drug    Negative: DOUBLE DOSE (an extra dose or lesser amount) of prescription drug and NO symptoms (Exception: a double dose of antibiotics)    Negative: Diabetes drug error or overdose (e.g., took wrong type of insulin or took extra dose)    Negative: Caller has medication question about med not  prescribed by PCP and triager unable to answer question (e.g., compatibility with other med, storage)    Negative: Request for URGENT new prescription or refill of 'essential' medication (i.e., likelihood of harm to patient if not taken) and triager unable to fill per department policy    Negative: Prescription not at pharmacy and was prescribed today by PCP    Negative: Pharmacy calling with prescription questions and triager unable to answer question    Negative: Caller has urgent medication question about med that PCP prescribed and triager unable to answer question    Negative: Caller has NON-URGENT medication question about med that PCP prescribed and triager unable to answer question    Negative: Caller requesting a NON-URGENT new prescription or refill and triager unable to refill per department policy    Negative: DOUBLE DOSE (an extra dose or lesser amount) of over-the-counter (OTC) drug and NO symptoms    Negative: DOUBLE DOSE (an extra dose or lesser amount) of antibiotic drug and NO symptoms    Caller requesting a refill, no triage required, and triager able to refill per department policy    Negative: Caller has medication question, adult has minor symptoms, caller declines triage, and triager answers question    Negative: Caller has medication question only, adult not sick, and triager answers question    Protocols used: MEDICATION QUESTION CALL-A-OH

## 2021-06-14 ENCOUNTER — TELEPHONE (OUTPATIENT)
Dept: GASTROENTEROLOGY | Facility: CLINIC | Age: 61
End: 2021-06-14

## 2021-06-14 RX ORDER — BISACODYL 5 MG
5 TABLET, DELAYED RELEASE (ENTERIC COATED) ORAL SEE ADMIN INSTRUCTIONS
Qty: 1 TABLET | Refills: 0 | Status: SHIPPED | OUTPATIENT
Start: 2021-06-14 | End: 2021-06-21 | Stop reason: HOSPADM

## 2021-06-14 RX ORDER — BISACODYL 5 MG/1
15 TABLET, DELAYED RELEASE ORAL SEE ADMIN INSTRUCTIONS
Qty: 3 TABLET | Refills: 0 | Status: SHIPPED | OUTPATIENT
Start: 2021-06-14 | End: 2021-06-21 | Stop reason: HOSPADM

## 2021-06-14 RX ORDER — BISACODYL 5 MG/1
5 TABLET, DELAYED RELEASE ORAL SEE ADMIN INSTRUCTIONS
Qty: 1 TABLET | Refills: 0 | Status: SHIPPED | OUTPATIENT
Start: 2021-06-14 | End: 2021-06-21 | Stop reason: HOSPADM

## 2021-06-14 RX ORDER — SODIUM, POTASSIUM,MAG SULFATES 17.5-3.13G
1 SOLUTION, RECONSTITUTED, ORAL ORAL SEE ADMIN INSTRUCTIONS
Qty: 177 ML | Refills: 0 | Status: SHIPPED | OUTPATIENT
Start: 2021-06-14 | End: 2021-06-21 | Stop reason: HOSPADM

## 2021-06-14 RX ORDER — SODIUM, POTASSIUM,MAG SULFATES 17.5-3.13G
2 SOLUTION, RECONSTITUTED, ORAL ORAL SEE ADMIN INSTRUCTIONS
Qty: 354 ML | Refills: 0 | Status: SHIPPED | OUTPATIENT
Start: 2021-06-14 | End: 2021-06-21 | Stop reason: HOSPADM

## 2021-06-14 NOTE — TELEPHONE ENCOUNTER
[facility] Call Center    Phone Message    May a detailed message be left on voicemail: yes     Reason for Call: Pharmacy calling asking if New litely generic would be ok to use. Pharmacy Go litely is on back order. Please advise. Thank you    Action Taken: Message routed to:  Adult Clinics: Gastroenterology (GI) p [id number]    Travel Screening: Not Applicable

## 2021-06-20 RX ORDER — LIDOCAINE 40 MG/G
CREAM TOPICAL
Status: CANCELLED | OUTPATIENT
Start: 2021-06-20

## 2022-02-14 DIAGNOSIS — E55.9 VITAMIN D INSUFFICIENCY: ICD-10-CM

## 2022-02-14 RX ORDER — CHOLECALCIFEROL (VITAMIN D3) 50 MCG
TABLET ORAL
Qty: 90 TABLET | Refills: 0 | Status: SHIPPED | OUTPATIENT
Start: 2022-02-14 | End: 2022-05-24

## 2022-02-14 NOTE — TELEPHONE ENCOUNTER
Prescription approved per [facility] Refill Protocol.  Jennifer Johnson RN

## 2022-02-22 ENCOUNTER — OFFICE VISIT (OUTPATIENT)
Dept: FAMILY MEDICINE | Facility: CLINIC | Age: 62
End: 2022-02-22
Payer: COMMERCIAL

## 2022-02-22 VITALS
SYSTOLIC BLOOD PRESSURE: 123 MMHG | DIASTOLIC BLOOD PRESSURE: 83 MMHG | RESPIRATION RATE: 18 BRPM | WEIGHT: 163 LBS | BODY MASS INDEX: 29.34 KG/M2 | OXYGEN SATURATION: 87 % | TEMPERATURE: 99.8 F | HEART RATE: 96 BPM

## 2022-02-22 DIAGNOSIS — J96.01 ACUTE RESPIRATORY FAILURE WITH HYPOXIA (H): Primary | ICD-10-CM

## 2022-02-22 DIAGNOSIS — F33.1 MODERATE EPISODE OF RECURRENT MAJOR DEPRESSIVE DISORDER (H): ICD-10-CM

## 2022-02-22 DIAGNOSIS — U07.1 INFECTION DUE TO 2019 NOVEL CORONAVIRUS: ICD-10-CM

## 2022-02-22 DIAGNOSIS — F11.21 HISTORY OF NARCOTIC ADDICTION (H): ICD-10-CM

## 2022-02-22 PROCEDURE — 99215 OFFICE O/P EST HI 40 MIN: CPT | Performed by: FAMILY MEDICINE

## 2022-02-22 ASSESSMENT — ENCOUNTER SYMPTOMS: COUGH: 1

## 2022-02-22 NOTE — PROGRESS NOTES
"  Assessment & Plan     Acute respiratory failure with hypoxia (H) secondary to COVID-19 pneumonia  Patient presented to the clinic for concern of productive cough of green mucus and exertional shortness of breath.  Tested positive for COVID-19 on 02/15/2022.  She is not vaccinated.  She also reports confusion and seeing white and black spots.  Denies any chest pain or palpitations.  She has a longstanding history of smoking for 10 years.  Initial vital signs showed oxygen saturation 87% on room air.  She was placed on 2 L supplemental oxygen her oxygen saturation increased to 92% on 2 L.   I recommend further evaluation and management in the ER.  Will most likely need further workup with labs and/or imaging.  Patient was transferred to the ED on oxygen for further evaluation and management.        Infection due to 2019 novel coronavirus  Tested positive for COVID 19 on 02/15/2022 at [facility].    Moderate episode of recurrent major depressive disorder (H)  Stable   Taking Paxil and trazodone.  History of narcotic addiction (H)  Follows at [facility]  And is on  Subaxone   Stable              BMI:   Estimated body mass index is 29.34 kg/m  as calculated from the following:    Height as of 5/25/21: 1.588 m (5' 2.5\").    Weight as of this encounter: 73.9 kg (163 lb).           Return in about 4 weeks (around 3/22/2022), or if symptoms worsen or fail to improve, for Follow up, in person.    Miley Orr MD  [facility]    Raysa Leary is a 61 year old who presents for the following health issues     Cough    History of Present Illness     Reason for visit:  Green phlegm and hard time catching my breath  Symptoms include:  Cough and hard time breathing when I move  Symptom intensity:  Moderate  Symptom progression:  Staying the same  Had these symptoms before:  No  What makes it worse:  Activity  What makes it better:  Laying down    She eats 2-3 servings of fruits and vegetables daily.She " consumes 1 sweetened beverage(s) daily.She exercises with enough effort to increase her heart rate 9 or less minutes per day.  She exercises with enough effort to increase her heart rate 3 or less days per week.   She is taking medications regularly.     Tested positive for COVID 19 on 02/15/39889 at [facility].  She is concerned about cough and exertional sob.    she has been feeling confused over the past few days and seeing white and black spots  She has long standing hx of smoking for 10 years   She has productive cough of green mucus   Follows at [facility]  And is on  Subaxone     Review of Systems   Respiratory: Positive for cough.       Constitutional, HEENT, cardiovascular, pulmonary, gi and gu systems are negative, except as otherwise noted.      Objective    /83   Pulse 96   Temp 99.8  F (37.7  C) (Tympanic)   Resp 18   Wt 73.9 kg (163 lb)   LMP 10/15/2007   SpO2 (!) 87%   BMI 29.34 kg/m    Body mass index is 29.34 kg/m .  Physical Exam  Vitals and nursing note reviewed.   Constitutional:       General: She is not in acute distress.     Appearance: Normal appearance. She is not ill-appearing, toxic-appearing or diaphoretic.   HENT:      Head: Normocephalic and atraumatic.   Cardiovascular:      Rate and Rhythm: Normal rate and regular rhythm.      Pulses: Normal pulses.      Heart sounds: Normal heart sounds. No murmur heard.    No gallop.   Pulmonary:      Effort: No respiratory distress.      Breath sounds: No stridor. Wheezing and rhonchi present. No rales.   Chest:      Chest wall: No tenderness.   Neurological:      General: No focal deficit present.      Mental Status: She is alert. Mental status is at baseline.

## 2022-03-03 ENCOUNTER — OFFICE VISIT (OUTPATIENT)
Dept: FAMILY MEDICINE | Facility: CLINIC | Age: 62
End: 2022-03-03
Payer: COMMERCIAL

## 2022-03-03 VITALS
HEIGHT: 63 IN | TEMPERATURE: 97 F | WEIGHT: 162 LBS | OXYGEN SATURATION: 95 % | HEART RATE: 64 BPM | SYSTOLIC BLOOD PRESSURE: 129 MMHG | DIASTOLIC BLOOD PRESSURE: 77 MMHG | BODY MASS INDEX: 28.7 KG/M2

## 2022-03-03 DIAGNOSIS — Z09 HOSPITAL DISCHARGE FOLLOW-UP: Primary | ICD-10-CM

## 2022-03-03 DIAGNOSIS — J96.01 ACUTE RESPIRATORY FAILURE WITH HYPOXIA (H): ICD-10-CM

## 2022-03-03 DIAGNOSIS — U07.1 INFECTION DUE TO 2019 NOVEL CORONAVIRUS: ICD-10-CM

## 2022-03-03 PROCEDURE — 99214 OFFICE O/P EST MOD 30 MIN: CPT | Performed by: FAMILY MEDICINE

## 2022-03-03 ASSESSMENT — PAIN SCALES - GENERAL: PAINLEVEL: NO PAIN (0)

## 2022-03-03 ASSESSMENT — PATIENT HEALTH QUESTIONNAIRE - PHQ9: SUM OF ALL RESPONSES TO PHQ QUESTIONS 1-9: 3

## 2022-03-03 NOTE — PROGRESS NOTES
"  Assessment & Plan     Hospital discharge follow-up  Patient is here for follow up after hospital discharge . She was admitted for acute respiratory failure secondary to COVID 19 infection   Discharged on home oxygen 2 l   Completed Decadron   Doing well     Acute respiratory failure with hypoxia (H)  Referral for 6MWT to wean her off oxygen   - 6 minute walk test; Future    Infection due to 2019 novel coronavirus  Tested positive 02/15/2022  Improving              BMI:   Estimated body mass index is 29.16 kg/m  as calculated from the following:    Height as of this encounter: 1.588 m (5' 2.5\").    Weight as of this encounter: 73.5 kg (162 lb).           Return in about 4 weeks (around 3/31/2022), or if symptoms worsen or fail to improve, for Follow up, in person.    Miley Orr MD  [facility]    Raysa Leary is a 61 year old who presents for the following health issues     HPI       Hospital Follow-up Visit:    Hospital/Nursing Home/ Rehab Facility: [facility]  Date of Admission: 2/22/22  Date of Discharge: 2/24/22  Reason(s) for Admission: Acute respiratory failure with hypoxia, COVID-19 infection.      Was your hospitalization related to COVID-19? YES   How are you feeling today? Better  In the past 24 hours have you had shortness of breath when speaking, walking, or climbing stairs? My breathing issues have improved  Do you have a cough? Yes, I have a cough but it's not worse  When is the last time you had a fever greater than 100? In the hospital  Are you having any other symptoms? Loss of appetite and Confusion   Do you have any other stressors you would like to discuss with your provider? No patient in the ICU or did the patient experience delirium during hospitalization?  No          Problems taking medications regularly:  None  Medication changes since discharge: None  Problems adhering to non-medication therapy:  None    Summary of hospitalization:  CareEverywhere " "information obtained and reviewed  Diagnostic Tests/Treatments reviewed.  Follow up needed: Oxygen supplementation wean off   Other Healthcare Providers Involved in Patient s Care:         None  Update since discharge: improved.       Post Discharge Medication Reconciliation: discharge medications reconciled, continue medications without change.  Plan of care communicated with patient and family            Hospital Course     Sapphire Vivar is a 61 y.o. female with a history of depression and history of narcotic addiction who presents with increased shortness of breath.  Patient reports that she developed a cough and tested positive for COVID-19 on February 15.  Over the last 2 days she has noted an increase in her shortness of breath.      The following issues were addressed during the hospitalization.    COVID Infection   Acute hypoxic Respiratory Insufficiency   Acute Respiratory Distress  Unvaccinated  Tested positive 2/15  Continue dexamathasone   CRP 0.9, Troponin I negative, BNP negative   Prn albuterol   Procalcitonin Is negative  CT does not show any suspicious consolidations  not on empiric antibiotics   CT negative for Pulmonary Embolism  Discharge home on supplemental oxygen And dexamathasone X total of 10d  Encouraged to get COVID Vaccine, she will consider it as out-patient      Depression  Continue home medications     Prophylaxis - pepcid   Modified VTE risk score is low, do no DVT prophylaxis needed   Recommendations for Outpatient Provider   PCP: No Pcp     Recommendations for the Outpatient Provider   Specific recommendations to be addressed at the follow up visit:  Acute Hypoxic Respiratory Failure due to COVID   -1/2 blood cultures shows coag negative staph, likely contaminant, need out-patient follow-up     Medication regimen changes: see \"Hospital Course\" above.    Follow-up labs/imaging: none    Other specialty follow-up not included in DC orders: None    Special considerations: " none.    Functional evaluations:   Fall Risk: Total Score (If 5 or > is High Risk): 0 (02/24/22 0951)  NuDESC (>/=2 abnormal): 0 (02/24/22 0951)   MOCA: // SLUMS:         Discharge Medications       Your Home Medicines     START taking these medicines   Instructions   benzonatate 100 mg capsule  For diagnoses: Acute respiratory failure with hypoxia (HC), COVID-19 virus infection  Commonly known as: TESSALON  Take 1 Capsule (100 mg) by mouth 3 times daily if needed for Cough.    dexAMETHasone 6 mg tablet  For diagnoses: COVID-19 virus infection  Start taking on: February 25, 2022  Commonly known as: DECADRON  Take 1 Tablet (6 mg) by mouth once daily with a meal for 7 days.    famotidine 20 mg tablet  For diagnoses: COVID-19 virus infection  Commonly known as: PEPCID  Take 1 Tablet (20 mg) by mouth 2 times daily for 8 days.    oxygen-air delivery systems  For diagnoses: Acute respiratory failure with hypoxia (HC)  Commonly known as: HOME OXYGEN  Oxygen for home use. Liters per minute: 2 liters per nasal cannula. Frequency of use: With activity;. Length of need: 99 Months.      CONTINUE taking these medicines   Instructions   albuterol HFA 90 mcg/actuation inhaler  For diagnoses: Acute bronchitis, unspecified organism  Commonly known as: PRO-AIR; VENTOLIN; PROVENTIL  Inhale 1-2 Puffs by mouth every 4 hours if needed for Shortness Of Breath or Wheezing.    cholecalciferol (Vitamin D3) 2,000 unit tablet  Take 1 Tablet by mouth once daily.    levothyroxine 50 mcg tablet  Commonly known as: SYNTHROID  Take 1 Tablet by mouth once daily.    PARoxetine 20 mg tablet  Commonly known as: PAXIL  Take 20 mg by mouth once daily.    traZODone 150 mg tablet  Commonly known as: DESYREL  Take 150 mg by mouth at bedtime.               Review of Systems   Constitutional, HEENT, cardiovascular, pulmonary, gi and gu systems are negative, except as otherwise noted.      Objective    /77 (BP Location: Left arm, Patient Position:  "Sitting, Cuff Size: Adult Regular)   Pulse 64   Temp 97  F (36.1  C) (Tympanic)   Ht 1.588 m (5' 2.5\")   Wt 73.5 kg (162 lb)   LMP 10/15/2007   SpO2 95%   BMI 29.16 kg/m    Body mass index is 29.16 kg/m .  Physical Exam  Vitals and nursing note reviewed.   Constitutional:       General: She is not in acute distress.     Appearance: Normal appearance. She is not ill-appearing, toxic-appearing or diaphoretic.   Cardiovascular:      Rate and Rhythm: Normal rate and regular rhythm.      Pulses: Normal pulses.      Heart sounds: Normal heart sounds. No murmur heard.    No friction rub. No gallop.   Pulmonary:      Effort: Pulmonary effort is normal. No respiratory distress.      Breath sounds: No stridor. No wheezing, rhonchi or rales.      Comments: On 2 L oxygen NC  Chest:      Chest wall: No tenderness.   Neurological:      Mental Status: She is alert.                        "

## 2022-03-08 ENCOUNTER — NURSE TRIAGE (OUTPATIENT)
Dept: FAMILY MEDICINE | Facility: CLINIC | Age: 62
End: 2022-03-08
Payer: COMMERCIAL

## 2022-03-08 NOTE — TELEPHONE ENCOUNTER
"Pt reporting #4/10 rt-sided chest pain onset yesterday, reports breathing exercises are more difficult to do today than previously, and denies other sx. Pt states she is on anticoagulant to prevent blood clots due to recent severe COVID infection.     RN advised pt needs to be seen in ED now, advised if she develops any new or worsening sx to call 911. Pt indicates understanding of issues and states she needs to make a call to arrange care for her child with special needs prior to going to the ED now.      Reason for Disposition    [1] Chest pain, pressure, or tightness AND [2] new-onset or worsening    Recent illness requiring prolonged bedrest (i.e., immobilization)    Additional Information    Negative: SEVERE chest pain    Negative: [1] Intermittent  chest pain or \"angina\" AND [2] increasing in severity or frequency  (Exception: pains lasting a few seconds)    Negative: Pain also present in shoulder(s) or arm(s) or jaw  (Exception: pain is clearly made worse by movement)    Negative: Difficulty breathing    Negative: Dizziness or lightheadedness    Negative: Coughing up blood    Answer Assessment - Initial Assessment Questions  1. LOCATION: \"Where does it hurt?\"        Rt-sided CP    2. ONSET: \"When did the chest pain begin?\" (Minutes, hours or days)       Yesterday    3. PATTERN \"Does the pain come and go, or has it been constant since it started?\"  \"Does it get worse with exertion?\"       constant    4. SEVERITY: \"How bad is the pain?\"  (e.g., Scale 1-10; mild, moderate, or severe)     - MILD (1-3): doesn't interfere with normal activities      - MODERATE (4-7): interferes with normal activities or awakens from sleep     - SEVERE (8-10): excruciating pain, unable to do any normal activities        #4/10    5. PULMONARY RISK FACTORS: \"Do you have any history of lung disease?\"  (e.g., blood clots in lung, asthma, emphysema, birth control pills)      COVID positive 2/22/22 and hospitalized for respiratory " "failure    9. CAUSE: \"What do you think is causing the chest pain?\"      Unknown to pt. RN concerned about risk of PE with recent COVID infection.    10. OTHER SYMPTOMS: \"Do you have any other symptoms?\" (e.g., dizziness, nausea, vomiting, sweating, fever, difficulty breathing, cough)        Unable to do her breathing exercises as well as usual.  DENIES: SOB at rest but is on supplemental oxygen still since hospital discharge, coughing up blood, blue discoloration of face, or dizziness.    Protocols used: CORONAVIRUS (COVID-19) PERSISTING SYMPTOMS FOLLOW-UP CALL-A- 8.25.2021, CHEST PAIN-A-AH      "

## 2022-03-10 ENCOUNTER — TELEPHONE (OUTPATIENT)
Dept: PULMONOLOGY | Facility: CLINIC | Age: 62
End: 2022-03-10
Payer: COMMERCIAL

## 2022-03-10 NOTE — TELEPHONE ENCOUNTER
----- Message from Anthony Herrera, RT sent at 3/10/2022  9:16 AM CST -----  Regarding: Reschedule  Whenever you get a chance can you please reschedule this patient? I just got an email from Shwetha, so per Shwetha we are going to stick to the current policy for PFTS until a new order is written out. So we have to wait 21 days from when they test positive to have a PFT. Hope this makes sense! Sorry for any confusion.       Thanks!!    Anthony

## 2022-03-10 NOTE — TELEPHONE ENCOUNTER
LVM for PT to call [phone] to rescedule the PFT appt for after the 3.16, becasue of the + date needs to be 21 days.

## 2022-03-11 NOTE — TELEPHONE ENCOUNTER
BEAUM (2nd) for PT to call [phone] to rescedule the PFT appt for after the 3.16, becasue of the + date needs to be 21 days.

## 2022-03-24 ENCOUNTER — OFFICE VISIT (OUTPATIENT)
Dept: FAMILY MEDICINE | Facility: CLINIC | Age: 62
End: 2022-03-24
Payer: COMMERCIAL

## 2022-03-24 VITALS
BODY MASS INDEX: 29.41 KG/M2 | SYSTOLIC BLOOD PRESSURE: 110 MMHG | HEART RATE: 76 BPM | OXYGEN SATURATION: 94 % | WEIGHT: 166 LBS | TEMPERATURE: 98.1 F | DIASTOLIC BLOOD PRESSURE: 73 MMHG | HEIGHT: 63 IN

## 2022-03-24 DIAGNOSIS — J96.01 ACUTE RESPIRATORY FAILURE WITH HYPOXIA (H): ICD-10-CM

## 2022-03-24 DIAGNOSIS — Z09 HOSPITAL DISCHARGE FOLLOW-UP: Primary | ICD-10-CM

## 2022-03-24 DIAGNOSIS — I26.99 BILATERAL PULMONARY EMBOLISM (H): ICD-10-CM

## 2022-03-24 PROCEDURE — 99214 OFFICE O/P EST MOD 30 MIN: CPT | Performed by: FAMILY MEDICINE

## 2022-03-24 RX ORDER — APIXABAN 5 MG (74)
KIT ORAL
COMMUNITY
Start: 2022-03-09 | End: 2022-06-27

## 2022-03-24 ASSESSMENT — PAIN SCALES - GENERAL: PAINLEVEL: NO PAIN (0)

## 2022-03-24 NOTE — PROGRESS NOTES
"  Assessment & Plan     Hospital discharge follow-up  Patient is here for follow-up after recent hospitalization.  Patient presented to the ED for evaluation of chest pain.  She was found to have bilateral pulmonary emboli without evidence of right ventricular strain.  She was started on Eliquis.  Today she reports he is doing well, she denies any chest pain, or significant shortness of breath.,  She is still on oxygen since her previous hospitalization for acute respiratory failure secondary to COVID-19 pneumonia.  She still smokes 3 to 4 cigarettes/day, counseled the patient against smoking.  She said she is going to stop smoking.  Bilateral pulmonary embolism (H)  Likely provoked PE in the setting of recent COVID-19 infection.  Probably she will need to be on Eliquis for total of 6 months.  Refill provided.  Patient agreed to do an E consult with hematology for further guidance.  - apixaban ANTICOAGULANT (ELIQUIS) 5 MG tablet; Take 1 tablet (5 mg) by mouth 2 times daily    Acute respiratory failure with hypoxia (H)  She had acute respiratory failure secondary to COVID-19 pneumonia versus COPD.  She is still on home oxygen.  She was referred to 6-minute walk test last visit but unfortunately she developed PE.  Advised her to go for 6-minute walk test in 2 weeks.  - General PFT Lab (Please always keep checked); Future  - 6 minute walk test; Future             BMI:   Estimated body mass index is 29.88 kg/m  as calculated from the following:    Height as of this encounter: 1.588 m (5' 2.5\").    Weight as of this encounter: 75.3 kg (166 lb).           No follow-ups on file.    Miley Orr MD  [facility]    Raysa Leary is a 61 year old who presents for the following health issues     History of Present Illness       Reason for visit:  Follow up from hospital stay  Symptom onset:  More than a month  Symptoms include:  Hard time breathing  Symptom intensity:  Mild  Symptom progression:  " "Improving  Had these symptoms before:  No  What makes it worse:  No  What makes it better:  Oxygen    She eats 2-3 servings of fruits and vegetables daily.She consumes 5 sweetened beverage(s) daily.She exercises with enough effort to increase her heart rate 9 or less minutes per day.  She exercises with enough effort to increase her heart rate 3 or less days per week.   She is taking medications regularly.         Hospital Follow-up Visit:    Hospital/Nursing Home/IP Rehab Facility: [facility]  Date of Admission: 3/8/22  Date of Discharge: 3/9/22  Reason(s) for Admission: Bilateral PE        Was your hospitalization related to COVID-19? No   Problems taking medications regularly:  None  Medication changes since discharge: None  Problems adhering to non-medication therapy:  None    Summary of hospitalization:  CareEverywhere information obtained and reviewed  Diagnostic Tests/Treatments reviewed.  Follow up needed: none  Other Healthcare Providers Involved in Patient s Care:         None  Update since discharge: improved.       Post Discharge Medication Reconciliation: discharge medications reconciled, continue medications without change.  Plan of care communicated with patient and family                    She is better , slight sob  No chest pain  She smokes 4-5 per day   She is planning to quit   No preference       Review of Systems   Constitutional, HEENT, cardiovascular, pulmonary, gi and gu systems are negative, except as otherwise noted.      Objective    /73 (BP Location: Left arm, Patient Position: Sitting, Cuff Size: Adult Regular)   Pulse 76   Temp 98.1  F (36.7  C) (Tympanic)   Ht 1.588 m (5' 2.5\")   Wt 75.3 kg (166 lb)   LMP 10/15/2007   SpO2 94%   BMI 29.88 kg/m    Body mass index is 29.88 kg/m .  Physical Exam  Vitals and nursing note reviewed.   Constitutional:       General: She is not in acute distress.     Appearance: Normal appearance. She is not ill-appearing, " toxic-appearing or diaphoretic.   Cardiovascular:      Rate and Rhythm: Normal rate and regular rhythm.      Pulses: Normal pulses.      Heart sounds: Normal heart sounds. No murmur heard.    No friction rub. No gallop.   Pulmonary:      Effort: Pulmonary effort is normal. No respiratory distress.      Breath sounds: Normal breath sounds. No stridor. No wheezing, rhonchi or rales.   Chest:      Chest wall: No tenderness.   Skin:     Capillary Refill: Capillary refill takes less than 2 seconds.   Neurological:      Mental Status: She is alert.

## 2022-03-31 ENCOUNTER — NURSE TRIAGE (OUTPATIENT)
Dept: NURSING | Facility: CLINIC | Age: 62
End: 2022-03-31
Payer: COMMERCIAL

## 2022-03-31 DIAGNOSIS — I26.99 BILATERAL PULMONARY EMBOLISM (H): Primary | ICD-10-CM

## 2022-03-31 NOTE — TELEPHONE ENCOUNTER
On oxygen and told she needs follow up lung tests before she can get off the oxygen. She's lost the information she was given on who to contact to set that up. She was seen on March 24th by Dr Miley Orr. I couldn't find that information in the clinic notes. Please call patient at:  [phone].  May leave a detailed message.  Thank you,  Jackie Edwards RN  [facility]    Additional Information    Negative: Nursing judgment    Negative: Nursing judgment    Negative: Nursing judgment    Nursing judgment    Protocols used: INFORMATION ONLY CALL - NO TRIAGE-A-OH

## 2022-04-01 NOTE — TELEPHONE ENCOUNTER
Per referral, patient can call [facility]  to set this appointment up.    Radha Shepherd BSN, RN

## 2022-04-01 NOTE — TELEPHONE ENCOUNTER
Spoke with patient, marlene duke phone number listed to schedule appointment. [phone].  Aida Campbell

## 2022-04-05 ENCOUNTER — OFFICE VISIT (OUTPATIENT)
Dept: NURSING | Facility: CLINIC | Age: 62
End: 2022-04-05
Attending: FAMILY MEDICINE
Payer: COMMERCIAL

## 2022-04-05 VITALS — HEIGHT: 62 IN | BODY MASS INDEX: 31.14 KG/M2 | WEIGHT: 169.2 LBS

## 2022-04-05 DIAGNOSIS — I26.99 BILATERAL PULMONARY EMBOLISM (H): ICD-10-CM

## 2022-04-05 DIAGNOSIS — J96.01 ACUTE RESPIRATORY FAILURE WITH HYPOXIA (H): ICD-10-CM

## 2022-04-05 LAB
6 MIN WALK (FT): 1780 FT
6 MIN WALK (M): 543 M

## 2022-04-05 PROCEDURE — 94618 PULMONARY STRESS TESTING: CPT | Performed by: INTERNAL MEDICINE

## 2022-04-07 ENCOUNTER — TELEPHONE (OUTPATIENT)
Dept: FAMILY MEDICINE | Facility: CLINIC | Age: 62
End: 2022-04-07
Payer: COMMERCIAL

## 2022-04-07 LAB — FIO2-PRE: 21 %

## 2022-04-07 NOTE — TELEPHONE ENCOUNTER
Incoming call from pt. Pt states she completed PFTs, see 4/5/22 6 min walk test results.    Pt states staff that administered PFTs stated pt could discontinue daytime supplemental O2 based on PFT results, but advised PCP needs to advise if pt can discontinue supplemental oxygen at night due to no sleep testing performed.    Pt states she has d/c'd both day and nighttime supplemental oxygen following the 4/5/22 PFTs, and reports no new sx or concerns. Pt states she does not monitor her SpO2% at night and states she does not have a pulse oximeter. Pt inquires if provider agrees she can discontinue supplement O2 at night?    Aida Kimble, TRAVISN, RN

## 2022-04-07 NOTE — TELEPHONE ENCOUNTER
Attempted to reach pt to relay provider message below as written. There was no answer. Left message to return call to a nurse at [phone].    Aida Kimble, TRAVISN, RN

## 2022-04-08 NOTE — TELEPHONE ENCOUNTER
Pt notified of provider message as written.  Pt verbalized good understanding.  Isa Vilchis BSN, RN

## 2022-05-24 DIAGNOSIS — E03.9 ACQUIRED HYPOTHYROIDISM: ICD-10-CM

## 2022-05-24 DIAGNOSIS — E55.9 VITAMIN D INSUFFICIENCY: ICD-10-CM

## 2022-05-24 RX ORDER — CHOLECALCIFEROL (VITAMIN D3) 50 MCG
TABLET ORAL
Qty: 90 TABLET | Refills: 0 | Status: SHIPPED | OUTPATIENT
Start: 2022-05-24 | End: 2022-06-27

## 2022-05-24 RX ORDER — LEVOTHYROXINE SODIUM 50 UG/1
50 TABLET ORAL DAILY
Qty: 90 TABLET | Refills: 0 | Status: SHIPPED | OUTPATIENT
Start: 2022-05-24 | End: 2022-06-27

## 2022-05-24 NOTE — TELEPHONE ENCOUNTER
Prescription approved per [facility] Refill Protocol.  APPT NEEDED FOR FURTHER REFILLS  Nancie refill given per RN protocol.   Due for office visit  Pharmacy note to inform pt of office visit needed for continued medication use  Jennifer Johnson RN

## 2022-06-27 ENCOUNTER — VIRTUAL VISIT (OUTPATIENT)
Dept: FAMILY MEDICINE | Facility: CLINIC | Age: 62
End: 2022-06-27
Payer: COMMERCIAL

## 2022-06-27 DIAGNOSIS — E55.9 VITAMIN D INSUFFICIENCY: ICD-10-CM

## 2022-06-27 DIAGNOSIS — E03.9 ACQUIRED HYPOTHYROIDISM: Primary | ICD-10-CM

## 2022-06-27 DIAGNOSIS — F33.1 MODERATE EPISODE OF RECURRENT MAJOR DEPRESSIVE DISORDER (H): ICD-10-CM

## 2022-06-27 DIAGNOSIS — F51.04 PSYCHOPHYSIOLOGICAL INSOMNIA: ICD-10-CM

## 2022-06-27 DIAGNOSIS — E78.5 DYSLIPIDEMIA: ICD-10-CM

## 2022-06-27 DIAGNOSIS — F41.1 ANXIETY STATE: ICD-10-CM

## 2022-06-27 PROCEDURE — 99214 OFFICE O/P EST MOD 30 MIN: CPT | Mod: 95 | Performed by: PHYSICIAN ASSISTANT

## 2022-06-27 RX ORDER — TRAZODONE HYDROCHLORIDE 150 MG/1
TABLET ORAL
Qty: 30 TABLET | Refills: 0 | Status: SHIPPED | OUTPATIENT
Start: 2022-06-27 | End: 2022-08-12

## 2022-06-27 RX ORDER — PAROXETINE 20 MG/1
20 TABLET, FILM COATED ORAL DAILY
Qty: 30 TABLET | Refills: 0 | Status: SHIPPED | OUTPATIENT
Start: 2022-06-27 | End: 2022-07-26

## 2022-06-27 RX ORDER — CHOLECALCIFEROL (VITAMIN D3) 50 MCG
1 TABLET ORAL DAILY
Qty: 30 TABLET | Refills: 0 | Status: SHIPPED | OUTPATIENT
Start: 2022-06-27 | End: 2022-07-26

## 2022-06-27 RX ORDER — LEVOTHYROXINE SODIUM 50 UG/1
50 TABLET ORAL DAILY
Qty: 30 TABLET | Refills: 0 | Status: SHIPPED | OUTPATIENT
Start: 2022-06-27 | End: 2022-08-12

## 2022-06-27 RX ORDER — ATORVASTATIN CALCIUM 10 MG/1
10 TABLET, FILM COATED ORAL DAILY
Qty: 30 TABLET | Refills: 0 | Status: SHIPPED | OUTPATIENT
Start: 2022-06-27 | End: 2022-07-28

## 2022-06-27 ASSESSMENT — ANXIETY QUESTIONNAIRES
3. WORRYING TOO MUCH ABOUT DIFFERENT THINGS: NOT AT ALL
6. BECOMING EASILY ANNOYED OR IRRITABLE: SEVERAL DAYS
GAD7 TOTAL SCORE: 1
2. NOT BEING ABLE TO STOP OR CONTROL WORRYING: NOT AT ALL
1. FEELING NERVOUS, ANXIOUS, OR ON EDGE: NOT AT ALL
GAD7 TOTAL SCORE: 1
7. FEELING AFRAID AS IF SOMETHING AWFUL MIGHT HAPPEN: NOT AT ALL
5. BEING SO RESTLESS THAT IT IS HARD TO SIT STILL: NOT AT ALL

## 2022-06-27 ASSESSMENT — ENCOUNTER SYMPTOMS
NAUSEA: 0
LIGHT-HEADEDNESS: 0
ABDOMINAL PAIN: 0
FEVER: 0
NERVOUS/ANXIOUS: 0
HEARTBURN: 0
CHILLS: 0
SHORTNESS OF BREATH: 0

## 2022-06-27 ASSESSMENT — PATIENT HEALTH QUESTIONNAIRE - PHQ9
SUM OF ALL RESPONSES TO PHQ QUESTIONS 1-9: 4
5. POOR APPETITE OR OVEREATING: NOT AT ALL

## 2022-06-27 NOTE — PROGRESS NOTES
Sapphire is a 61 year old who is being evaluated via a billable video visit.      How would you like to obtain your AVS? Mail a copy  If the video visit is dropped, the invitation should be resent by: Text to cell phone: [phone]  Will anyone else be joining your video visit? No      Assessment & Plan     Moderate episode of recurrent major depressive disorder (H)  Anxiety state  Patient is a 61-year-old female who presents to clinic for refill of paroxetine for management of anxiety and depression.  Patient notes medication works well for her.  No side effects.  No SI.  Patient is also requesting refills of all other medications and is overdue for annual physical with PCP.  Discussed with patient that short-term refills will be provided, but that she requires physical as well as labs for any additional refills.  Patient verbalized understanding agrees with plan.  - PARoxetine (PAXIL) 20 MG tablet; Take 1 tablet (20 mg) by mouth daily    Vitamin D insufficiency  Patient to recheck vitamin D level at next visit.  Refill provided.  - vitamin D3 (VITAMIN D3) 50 mcg (2000 units) tablet; Take 1 tablet (50 mcg) by mouth daily    Acquired hypothyroidism  Patient to recheck thyroid levels at next visit.  Refill provided.  - levothyroxine (SYNTHROID/LEVOTHROID) 50 MCG tablet; Take 1 tablet (50 mcg) by mouth daily    Dyslipidemia  Patient to recheck cholesterol at next visit.  Refill provided.  - atorvastatin (LIPITOR) 10 MG tablet; Take 1 tablet (10 mg) by mouth daily    Psychophysiological insomnia  Refill provided.  - traZODone (DESYREL) 150 MG tablet; TAKE ONE TABLET BY MOUTH AT BEDTIME      See Patient Instructions    Return in about 2 weeks (around 7/11/2022) for Physical Exam.    Helena David PA-C  [facility] MAXINE Leary is a 61 year old, presenting for the following health issues:  Video Visit (Depression)      HPI     Depression and Anxiety Follow-Up  Paxil 20mg every day    How are  you doing with your depression since your last visit? No change    How are you doing with your anxiety since your last visit?  No change    Are you having other symptoms that might be associated with depression or anxiety? No    Have you had a significant life event? No     Do you have any concerns with your use of alcohol or other drugs? No     Patient requesting refill of all medication. Patient states she is unsure if she has a PCP, but does have an appointment for follow up care 7/14 with most recently seen PCP.     Requesting refill of vitamin D3.  History of vitamin D deficiency.  Requesting refill of levothyroxine.  Compliant with daily dosing.  Requesting refill of atorvastatin.  Compliant with daily dosing.  Requesting refill of trazodone-1 tablet used at night for sleep.  No side effects.  Refill of Paxil-works well to manage depression and anxiety for patient.  No side effects.    Patient is prescribed apixaban, but has completed 6 months of treatment prescribed.    Patient is aware that she needs to complete lab work for vitamin D, cholesterol, and thyroid and is agreeable to completing this at primary care visit.        Social History     Tobacco Use     Smoking status: Current Every Day Smoker     Packs/day: 1.00     Years: 20.00     Pack years: 20.00     Types: Cigarettes     Smokeless tobacco: Never Used     Tobacco comment: patient wuit for 27 years and started again    Vaping Use     Vaping Use: Never used   Substance Use Topics     Alcohol use: No     Drug use: No     PHQ 1/25/2021 3/3/2022 6/27/2022   PHQ-9 Total Score 9 3 4   Q9: Thoughts of better off dead/self-harm past 2 weeks Not at all Not at all Not at all     ELIE-7 SCORE 6/27/2022   Total Score 1     Last PHQ-9 6/27/2022   1.  Little interest or pleasure in doing things 0   2.  Feeling down, depressed, or hopeless 0   3.  Trouble falling or staying asleep, or sleeping too much 1   4.  Feeling tired or having little energy 1   5.  Poor  appetite or overeating 2   6.  Feeling bad about yourself 0   7.  Trouble concentrating 0   8.  Moving slowly or restless 0   Q9: Thoughts of better off dead/self-harm past 2 weeks 0   PHQ-9 Total Score 4   Difficulty at work, home, or with people -     ELIE-7  6/27/2022   1. Feeling nervous, anxious, or on edge 0   2. Not being able to stop or control worrying 0   3. Worrying too much about different things 0   4. Trouble relaxing 0   5. Being so restless that it is hard to sit still 0   6. Becoming easily annoyed or irritable 1   7. Feeling afraid, as if something awful might happen 0   ELIE-7 Total Score 1           Review of Systems   Constitutional: Negative for chills and fever.   Respiratory: Negative for shortness of breath.    Cardiovascular: Negative for chest pain.   Gastrointestinal: Negative for abdominal pain, heartburn and nausea.   Skin: Negative for rash.   Neurological: Negative for light-headedness.   Psychiatric/Behavioral: The patient is not nervous/anxious.             Objective           Vitals:  No vitals were obtained today due to virtual visit.    Physical Exam   GENERAL: Healthy, alert and no distress  EYES: Eyes grossly normal to inspection.  No discharge or erythema, or obvious scleral/conjunctival abnormalities.  RESP: No audible wheeze, cough, or visible cyanosis.  No visible retractions or increased work of breathing.    SKIN: Visible skin clear. No significant rash, abnormal pigmentation or lesions.  NEURO: Cranial nerves grossly intact.  Mentation and speech appropriate for age.  PSYCH: Mentation appears normal, affect normal/bright, judgement and insight intact, normal speech and appearance well-groomed.            Video-Visit Details    Video Start Time: 11:07am    Type of service:  Video Visit    Video End Time 11:27am    Originating Location (pt. Location): Home    Distant Location (provider location):  [facility] [employer]     Platform used for Video Visit:  [facility]    .  ..

## 2022-06-27 NOTE — PATIENT INSTRUCTIONS
Marino Leary,     You have been provided with 1 month of refills of Paxil, levothyroxine, trazodone, Lipitor, and vitamin D.  As discussed, you do need to follow-up with your primary care provider as scheduled for your yearly physical as well as lab work for any additional refills.    Please reach out with questions or concerns.  Take Care,  Helena David PA-C

## 2022-07-26 DIAGNOSIS — E78.5 DYSLIPIDEMIA: ICD-10-CM

## 2022-07-26 DIAGNOSIS — E55.9 VITAMIN D INSUFFICIENCY: ICD-10-CM

## 2022-07-26 DIAGNOSIS — F33.1 MODERATE EPISODE OF RECURRENT MAJOR DEPRESSIVE DISORDER (H): ICD-10-CM

## 2022-07-26 DIAGNOSIS — F41.1 ANXIETY STATE: ICD-10-CM

## 2022-07-26 RX ORDER — CHOLECALCIFEROL (VITAMIN D3) 50 MCG
1 TABLET ORAL DAILY
Qty: 90 TABLET | Refills: 1 | Status: SHIPPED | OUTPATIENT
Start: 2022-07-26 | End: 2023-04-12

## 2022-07-26 RX ORDER — PAROXETINE 20 MG/1
20 TABLET, FILM COATED ORAL DAILY
Qty: 90 TABLET | Refills: 1 | Status: SHIPPED | OUTPATIENT
Start: 2022-07-26 | End: 2022-08-12

## 2022-07-26 NOTE — TELEPHONE ENCOUNTER
Reason for Call:  Medication or medication refill:     Do you use a [facility]?  Name of the pharmacy and phone number for the current request:  [facility] [phone]     Name of the medication requested:   - PARoxetine (PAXIL) 20 MG tablet   - atorvastatin (LIPITOR) 10 MG tablet   - vitamin D3 (VITAMIN D3) 50 mcg     Other request: Pt has Dr's appt in August- is nearly out of medication but needs enough to last until appt. Please call and advise.     Can we leave a detailed message on this number? YES     Phone number patient can be reached at: Home number on file [phone] (home)     Best Time: anytime     Call taken on 7/26/2022 at 11:37 AM by Dinorah Botello

## 2022-07-28 RX ORDER — ATORVASTATIN CALCIUM 10 MG/1
10 TABLET, FILM COATED ORAL DAILY
Qty: 30 TABLET | Refills: 0 | Status: SHIPPED | OUTPATIENT
Start: 2022-07-28 | End: 2022-08-12

## 2022-08-01 ENCOUNTER — PATIENT OUTREACH (OUTPATIENT)
Dept: FAMILY MEDICINE | Facility: CLINIC | Age: 62
End: 2022-08-01

## 2022-08-01 NOTE — LETTER
August 1, 2022      Sapphire Vivar  [address]  [facility] [address]      Your healthcare team cares about your health. To provide you with the best care, we have reviewed your chart and based on our findings, we see that you are due to:     - BREAST CANCER SCREENING:  Schedule Annual Mammogram. [facility] scheduling number - [phone] or schedule in MyChart (self referall)  - COLON CANCER SCREENING:  Call or mychart the clinic to schedule your colonoscopy or schedule/ your FIT Test, or Cologuard test  - OTHER FOLLOW UP:  Yearly Physical with fasting labs.     If you have already completed these items, please contact the clinic via phone or [facility] so your care team can review and update your records.  Thank you for choosing [facility] for your healthcare needs. For any questions, concerns, or to schedule an appointment please contact the clinic.       Healthy Regards,      Your [facility] Care Team

## 2022-08-01 NOTE — TELEPHONE ENCOUNTER
Patient Quality Outreach    Patient is due for the following:   Colon Cancer Screening -  Colonoscopy  Breast Cancer Screening - Mammogram  Physical  - Due after NOW     NEXT STEPS:   Schedule a yearly physical    Type of outreach:    Sent letter.      Questions for provider review:    None     Abby Dubon, CMA

## 2022-08-12 ENCOUNTER — OFFICE VISIT (OUTPATIENT)
Dept: FAMILY MEDICINE | Facility: CLINIC | Age: 62
End: 2022-08-12
Payer: COMMERCIAL

## 2022-08-12 ENCOUNTER — HOSPITAL ENCOUNTER (OUTPATIENT)
Facility: AMBULATORY SURGERY CENTER | Age: 62
End: 2022-08-12
Attending: INTERNAL MEDICINE
Payer: COMMERCIAL

## 2022-08-12 ENCOUNTER — TELEPHONE (OUTPATIENT)
Dept: GASTROENTEROLOGY | Facility: CLINIC | Age: 62
End: 2022-08-12

## 2022-08-12 VITALS
TEMPERATURE: 97.7 F | BODY MASS INDEX: 29.81 KG/M2 | WEIGHT: 162 LBS | HEIGHT: 62 IN | HEART RATE: 86 BPM | SYSTOLIC BLOOD PRESSURE: 114 MMHG | DIASTOLIC BLOOD PRESSURE: 70 MMHG | OXYGEN SATURATION: 93 %

## 2022-08-12 DIAGNOSIS — F51.04 PSYCHOPHYSIOLOGICAL INSOMNIA: ICD-10-CM

## 2022-08-12 DIAGNOSIS — I26.99 BILATERAL PULMONARY EMBOLISM (H): ICD-10-CM

## 2022-08-12 DIAGNOSIS — F33.1 MODERATE EPISODE OF RECURRENT MAJOR DEPRESSIVE DISORDER (H): ICD-10-CM

## 2022-08-12 DIAGNOSIS — Z12.11 SCREEN FOR COLON CANCER: Primary | ICD-10-CM

## 2022-08-12 DIAGNOSIS — F41.1 ANXIETY STATE: ICD-10-CM

## 2022-08-12 DIAGNOSIS — E78.5 DYSLIPIDEMIA: ICD-10-CM

## 2022-08-12 DIAGNOSIS — E03.9 ACQUIRED HYPOTHYROIDISM: Primary | ICD-10-CM

## 2022-08-12 DIAGNOSIS — Z12.31 ENCOUNTER FOR SCREENING MAMMOGRAM FOR BREAST CANCER: ICD-10-CM

## 2022-08-12 DIAGNOSIS — Z12.11 COLON CANCER SCREENING: ICD-10-CM

## 2022-08-12 PROCEDURE — 99214 OFFICE O/P EST MOD 30 MIN: CPT | Performed by: FAMILY MEDICINE

## 2022-08-12 RX ORDER — ATORVASTATIN CALCIUM 10 MG/1
10 TABLET, FILM COATED ORAL DAILY
Qty: 30 TABLET | Refills: 0 | Status: SHIPPED | OUTPATIENT
Start: 2022-08-12 | End: 2022-09-12

## 2022-08-12 RX ORDER — TRAZODONE HYDROCHLORIDE 150 MG/1
TABLET ORAL
Qty: 30 TABLET | Refills: 0 | Status: SHIPPED | OUTPATIENT
Start: 2022-08-12 | End: 2022-09-12

## 2022-08-12 RX ORDER — LEVOTHYROXINE SODIUM 50 UG/1
50 TABLET ORAL DAILY
Qty: 30 TABLET | Refills: 0 | Status: SHIPPED | OUTPATIENT
Start: 2022-08-12 | End: 2022-09-12

## 2022-08-12 RX ORDER — PAROXETINE 20 MG/1
20 TABLET, FILM COATED ORAL DAILY
Qty: 90 TABLET | Refills: 1 | Status: SHIPPED | OUTPATIENT
Start: 2022-08-12 | End: 2023-04-28

## 2022-08-12 ASSESSMENT — ANXIETY QUESTIONNAIRES
GAD7 TOTAL SCORE: 1
GAD7 TOTAL SCORE: 1
2. NOT BEING ABLE TO STOP OR CONTROL WORRYING: NOT AT ALL
1. FEELING NERVOUS, ANXIOUS, OR ON EDGE: NOT AT ALL
3. WORRYING TOO MUCH ABOUT DIFFERENT THINGS: SEVERAL DAYS
4. TROUBLE RELAXING: NOT AT ALL
GAD7 TOTAL SCORE: 1
7. FEELING AFRAID AS IF SOMETHING AWFUL MIGHT HAPPEN: NOT AT ALL
5. BEING SO RESTLESS THAT IT IS HARD TO SIT STILL: NOT AT ALL
7. FEELING AFRAID AS IF SOMETHING AWFUL MIGHT HAPPEN: NOT AT ALL
IF YOU CHECKED OFF ANY PROBLEMS ON THIS QUESTIONNAIRE, HOW DIFFICULT HAVE THESE PROBLEMS MADE IT FOR YOU TO DO YOUR WORK, TAKE CARE OF THINGS AT HOME, OR GET ALONG WITH OTHER PEOPLE: SOMEWHAT DIFFICULT
8. IF YOU CHECKED OFF ANY PROBLEMS, HOW DIFFICULT HAVE THESE MADE IT FOR YOU TO DO YOUR WORK, TAKE CARE OF THINGS AT HOME, OR GET ALONG WITH OTHER PEOPLE?: SOMEWHAT DIFFICULT
6. BECOMING EASILY ANNOYED OR IRRITABLE: NOT AT ALL

## 2022-08-12 ASSESSMENT — PAIN SCALES - GENERAL: PAINLEVEL: NO PAIN (0)

## 2022-08-12 NOTE — PROGRESS NOTES
"  Assessment & Plan     Acquired hypothyroidism  Chronic stable problem   Continue synthroid   - levothyroxine (SYNTHROID/LEVOTHROID) 50 MCG tablet; Take 1 tablet (50 mcg) by mouth daily    Moderate episode of recurrent major depressive disorder (H)  Stable on current meds   Refill provided   - PARoxetine (PAXIL) 20 MG tablet; Take 1 tablet (20 mg) by mouth daily    Anxiety state  Stable   - PARoxetine (PAXIL) 20 MG tablet; Take 1 tablet (20 mg) by mouth daily    Psychophysiological insomnia    - traZODone (DESYREL) 150 MG tablet; TAKE ONE TABLET BY MOUTH AT BEDTIME    Dyslipidemia  Chronic stable problem   Continue statin therapy   - atorvastatin (LIPITOR) 10 MG tablet; Take 1 tablet (10 mg) by mouth daily    Bilateral pulmonary embolism (H)  HAD PE after severe COViD 19 infection , she has been on Eliquis since then   Reports some shortness of breath   Discussed with patient her concern   Continue eliquis  for now m refill provided   Follow up with hematology   - apixaban ANTICOAGULANT (ELIQUIS) 5 MG tablet; Take 1 tablet (5 mg) by mouth 2 times daily  - Adult Oncology/Hematology  Referral; Future    Encounter for screening mammogram for breast cancer    - MA SCREENING DIGITAL BILAT - Future  (s+30); Future    Colon cancer screening    - Colonscopy Screening  Referral; Future             Nicotine/Tobacco Cessation:  She reports that she has been smoking cigarettes. She has a 20.00 pack-year smoking history. She has never used smokeless tobacco.  Nicotine/Tobacco Cessation Plan:   Information offered: Patient not interested at this time      BMI:   Estimated body mass index is 29.63 kg/m  as calculated from the following:    Height as of this encounter: 1.575 m (5' 2\").    Weight as of this encounter: 73.5 kg (162 lb).   Weight management plan: Discussed healthy diet and exercise guidelines    Work on weight loss  Regular exercise    No follow-ups on file.    Miley Orr MD  [facility] " "[facility]    Subjective   Sapphire is a 62 year old, presenting for the following health issues:  Mental Health Problem (Follow up/)      History of Present Illness       Mental Health Follow-up:  Patient presents to follow-up on Anxiety.    Patient's anxiety since last visit has been:  No change  The patient is not having other symptoms associated with anxiety.  Any significant life events: No  Patient is not feeling anxious or having panic attacks.  Patient has no concerns about alcohol or drug use.    She eats 0-1 servings of fruits and vegetables daily.She consumes 0 sweetened beverage(s) daily.She exercises with enough effort to increase her heart rate 9 or less minutes per day.  She exercises with enough effort to increase her heart rate 3 or less days per week.   She is taking medications regularly.  Today's ELIE-7 Score: 1     She reports memory issue         Review of Systems   Constitutional, HEENT, cardiovascular, pulmonary, gi and gu systems are negative, except as otherwise noted.      Objective    /70 (BP Location: Left arm, Patient Position: Sitting, Cuff Size: Adult Regular)   Pulse 86   Temp 97.7  F (36.5  C) (Tympanic)   Ht 1.575 m (5' 2\")   Wt 73.5 kg (162 lb)   LMP 10/15/2007   SpO2 93%   BMI 29.63 kg/m    Body mass index is 29.63 kg/m .  Physical Exam  Vitals and nursing note reviewed.   Constitutional:       General: She is not in acute distress.     Appearance: Normal appearance. She is obese. She is not ill-appearing, toxic-appearing or diaphoretic.   HENT:      Head: Normocephalic and atraumatic.   Neurological:      Mental Status: She is alert.                            .  ..  "

## 2022-08-12 NOTE — TELEPHONE ENCOUNTER
Screening Questions    BlueKIND OF PREP RedLOCATION [review exclusion criteria] GreenSEDATION TYPE      1. Are you active on mychart? N    2. What insurance is in the chart? [employer]     3.   Ordering/Referring Provider: Miley Orr MD in [facility]    4. BMI   (If greater than 40 review exclusion criteria [PAC APPT IF [MAC] @ [facility])  29.6  [If yes, BMI OVER 40-EXTENDED PREP]      **(Sedation review/consideration needed)**  Do you have a legal guardian or Medical Power of    and/or are you able to give consent for your medical care?     y    5. Have you had a positive covid test in the last 90 days?   n - n    6.  Are you currently on dialysis?   N [ If yes, G-PREP & HOSPITAL setting ONLY]     7.  Do you have chronic kidney disease?  N [ If yes, G-PREP ]    8.   Do you have a diagnosis of diabetes?   N   [ If yes, G-PREP ]    9.  On a regular basis do you go 3-5 days between bowel movements?   Y   [ If yes, EXTENDED PREP]    10.  Are you taking any prescription pain medications on a routine schedule?    Y - SUBOXONE  [ If yes, EXTENDED PREP] [If yes, MAC]      11.   Do you have any chemical dependencies such as alcohol, street drugs, or methadone?    n [If yes, MAC]    12.   Do you have any history of post-traumatic stress syndrome, severe anxiety or history of psychosis?    y  [If yes, MAC]    13.  [FEMALES] Are you currently pregnant?     If yes, how many weeks?       Respiratory/Heart Screening:  [If yes to any of the following HOSPITAL setting only]     14. Do you have Pulmonary Hypertension [Lungs]?   N       15. Do you have UNCONTROLLED asthma?   N     16.  Do you use daily home oxygen?  N      17. Do you have mod to severe Obstructive Sleep Apnea?         (OKAY @ [facility]  [facility]  SH  PH  RI  [facility] - if pt is not on OXYGEN)  N      18.   Have you had a heart or lung transplant?   N      19.   Have you had a stroke or Transient ischemic attack (TIA - aka  mini stroke ) within 6 months?  (If yes,  please review exclusion criteria)  N     20.   In the past 6 months, have you had any heart related issues including cardiomyopathy or heart attack?   N           If yes, did it require cardiac stenting or other implantable device?   N      21.   Do you have any implantable devices in your body (pacemaker, defib, LVAD)? (If yes, please review exclusion criteria)  N   22.  Do you take the medication Phentermine?  NO        23. Do you take nitroglycerin?   N           If yes, how often? N  (if yes, HOSPITAL setting ONLY)    24.  Are you currently taking any blood thinners?    [If yes, INFORM patient to follw up w/ ORDERING PROVIDER FOR BRIDGING INSTRUCTIONS]     Y, CHERI    25.   Do you transfer independently?                (If NO, please HOSPITAL setting ONLY)  Y    26.   Preferred LOCAL Pharmacy for Pre Prescription:      [id number] - [place] [facility] [address]    Scheduling Details  (Please ask for phone number if not scheduled by patient)      Caller : Sapphire  Date of Procedure: 9/28  Surgeon: Callie  Location: [facility]    Sedation Type: MAC l anxiety and suboxone  Conscious Sedation- Needs  for 6 hours after the procedure  MAC/General-Needs  for 24 hours after procedure    n :[Pre-op Required] at [facility]  [facility] and OR for MAC sedation   (advise patient they will need a pre-op WITH IN 30 DAYS of procedure date)     Type of Procedure Scheduled:   Lower Endoscopy [Colonoscopy]    Which Colonoscopy Prep was Sent?:   extended - constipation, suboxone    KHORUTS CF PATIENTS & GROEN'S PATIENTS NEEDS EXTENDED PREP       Informed patient they will need an adult  y  Cannot take any type of public or medical transportation alone    Pre-Procedure Covid test to be completed [facility] or Externally: home     Confirmed Nurse will call to complete assessment y    Additional comments: none

## 2022-08-12 NOTE — PATIENT INSTRUCTIONS
Set Up     - colonoscopy     - Mammogram     - Blood doctor appointment       - pick meds     - will continue to monitor memory issue , if no improvement will send you to neurology       _ please stop smoking

## 2022-08-24 NOTE — PROGRESS NOTES
RECORDS STATUS - ALL OTHER DIAGNOSIS      RECORDS RECEIVED FROM:[facility]/ [facility] / Elise/ [facility]   DATE RECEIVED: 10/12/2022    Action    Action Taken 8/24/2022 8:52AM MARIALUISA   I called [facility] IMG Dept Ph: [phone] - the CT Chest from 3/2022 will be pushed over soon    I called [facility] IMG dept Ph: [phone] - Feb 2022 images will be pushed to PACS soon    I faxed a request for imaging to [facility] IMG Dept     9/7/2022 9:46AM MARIALUISA   I Called [facility] again- images are being pushed over right now!     I called [facility] dept [phone] option 4- they pulled up the pt's chart     I resolved images in PACS      NOTES STATUS DETAILS   OFFICE NOTE from referring provider Complete Miley Galicia MD   DISCHARGE SUMMARY from hospital Complete CE-2/22/2022 Acute respiratory failure with hypoxia    DISCHARGE REPORT from the ER Complete 3/8/2022 CE- Bilateral Pulmonary Embolism    OPERATIVE REPORT Complete 4/5/2022 General PFT    MEDICATION LIST Complete [facility]   CLINICAL TRIAL TREATMENTS TO DATE     LABS     PATHOLOGY REPORTS     ANYTHING RELATED TO DIAGNOSIS Complete CE- Labs last updated on 3/9/2022    GENONOMIC TESTING     TYPE:     IMAGING (NEED IMAGES & REPORT)     CT SCANS Complete- [facility]    Complete- [facility]  CT Chest 3/8/2022 ([facility])    CT Chest ([facility]) 2/22/2022    MRI     Xray Chest Complete    Complete- [facility]    Complete- [facility]  5/25/2021    [facility] Xray Chest 2/22/2022    [facility] Xray Chest 5/3/2017   MAMMO     ULTRASOUND     PET

## 2022-09-12 DIAGNOSIS — I26.99 BILATERAL PULMONARY EMBOLISM (H): ICD-10-CM

## 2022-09-12 DIAGNOSIS — F51.04 PSYCHOPHYSIOLOGICAL INSOMNIA: ICD-10-CM

## 2022-09-12 DIAGNOSIS — E78.5 DYSLIPIDEMIA: ICD-10-CM

## 2022-09-12 DIAGNOSIS — E03.9 ACQUIRED HYPOTHYROIDISM: ICD-10-CM

## 2022-09-12 RX ORDER — TRAZODONE HYDROCHLORIDE 150 MG/1
TABLET ORAL
Qty: 90 TABLET | Refills: 2 | Status: SHIPPED | OUTPATIENT
Start: 2022-09-12 | End: 2023-06-02

## 2022-09-12 RX ORDER — LEVOTHYROXINE SODIUM 50 UG/1
50 TABLET ORAL DAILY
Qty: 30 TABLET | Refills: 0 | Status: SHIPPED | OUTPATIENT
Start: 2022-09-12 | End: 2022-11-01

## 2022-09-12 RX ORDER — ATORVASTATIN CALCIUM 10 MG/1
10 TABLET, FILM COATED ORAL DAILY
Qty: 30 TABLET | Refills: 0 | Status: SHIPPED | OUTPATIENT
Start: 2022-09-12 | End: 2022-11-01

## 2022-09-19 RX ORDER — BISACODYL 5 MG
TABLET, DELAYED RELEASE (ENTERIC COATED) ORAL
Qty: 4 TABLET | Refills: 0 | Status: SHIPPED | OUTPATIENT
Start: 2022-09-19 | End: 2022-09-28 | Stop reason: HOSPADM

## 2022-09-27 RX ORDER — LIDOCAINE 40 MG/G
CREAM TOPICAL
Status: CANCELLED | OUTPATIENT
Start: 2022-09-27

## 2022-09-27 RX ORDER — NALOXONE HYDROCHLORIDE 0.4 MG/ML
0.2 INJECTION, SOLUTION INTRAMUSCULAR; INTRAVENOUS; SUBCUTANEOUS
Status: CANCELLED | OUTPATIENT
Start: 2022-09-27

## 2022-09-27 RX ORDER — ONDANSETRON 2 MG/ML
4 INJECTION INTRAMUSCULAR; INTRAVENOUS EVERY 6 HOURS PRN
Status: CANCELLED | OUTPATIENT
Start: 2022-09-27

## 2022-09-27 RX ORDER — NALOXONE HYDROCHLORIDE 0.4 MG/ML
0.4 INJECTION, SOLUTION INTRAMUSCULAR; INTRAVENOUS; SUBCUTANEOUS
Status: CANCELLED | OUTPATIENT
Start: 2022-09-27

## 2022-09-27 RX ORDER — FLUMAZENIL 0.1 MG/ML
0.2 INJECTION, SOLUTION INTRAVENOUS
Status: CANCELLED | OUTPATIENT
Start: 2022-09-27 | End: 2022-09-28

## 2022-09-27 RX ORDER — ONDANSETRON 4 MG/1
4 TABLET, ORALLY DISINTEGRATING ORAL EVERY 6 HOURS PRN
Status: CANCELLED | OUTPATIENT
Start: 2022-09-27

## 2022-09-27 RX ORDER — ONDANSETRON 2 MG/ML
4 INJECTION INTRAMUSCULAR; INTRAVENOUS
Status: CANCELLED | OUTPATIENT
Start: 2022-09-27

## 2022-09-27 RX ORDER — PROCHLORPERAZINE MALEATE 10 MG
10 TABLET ORAL EVERY 6 HOURS PRN
Status: CANCELLED | OUTPATIENT
Start: 2022-09-27

## 2022-10-12 ENCOUNTER — ONCOLOGY VISIT (OUTPATIENT)
Dept: ONCOLOGY | Facility: CLINIC | Age: 62
End: 2022-10-12
Attending: FAMILY MEDICINE
Payer: COMMERCIAL

## 2022-10-12 ENCOUNTER — PRE VISIT (OUTPATIENT)
Dept: ONCOLOGY | Facility: CLINIC | Age: 62
End: 2022-10-12

## 2022-10-12 VITALS
RESPIRATION RATE: 20 BRPM | HEART RATE: 76 BPM | TEMPERATURE: 98.3 F | WEIGHT: 166.2 LBS | DIASTOLIC BLOOD PRESSURE: 67 MMHG | SYSTOLIC BLOOD PRESSURE: 100 MMHG | BODY MASS INDEX: 30.4 KG/M2 | OXYGEN SATURATION: 96 %

## 2022-10-12 DIAGNOSIS — I26.99 BILATERAL PULMONARY EMBOLISM (H): ICD-10-CM

## 2022-10-12 DIAGNOSIS — M79.89 LEG SWELLING: Primary | ICD-10-CM

## 2022-10-12 PROCEDURE — 99204 OFFICE O/P NEW MOD 45 MIN: CPT | Performed by: INTERNAL MEDICINE

## 2022-10-12 ASSESSMENT — PAIN SCALES - GENERAL: PAINLEVEL: NO PAIN (0)

## 2022-10-12 NOTE — LETTER
10/12/2022         RE: Sapphire Vivar  [address] [facility] [address]        Dear Colleague,    Thank you for referring your patient, Sapphire Vivar, to the [facility]. Please see a copy of my visit note below.    Hematology initial visit:  Date on this visit: 10/12/2022    Sapphire Vivar  is referred by Dr.Islam Orr for a hematology consultation. She requires evaluation for pulmonary embolism.  Primary Physician: No Ref-Primary, Physician     History Of Present Illness:  Ms. Vivar is a 62 year old female who presents with pulmonary embolism.      She was diagnosed with COVID pneumonia in February 2022.  She was admitted from 2/22/2022 - 2/24/2022 for acute hypoxic respiratory insufficiency due to COVID.  She was tested positive for COVID-19 on February 15.  She was treated with the steroids and at that time CT PE was negative for pulmonary embolism.  She required supplemental oxygen and was discharged on home oxygen.    She presented on 3/8/2022 for right-sided pleuritic chest and back pain.  Repeat CT chest PE protocol was done and it showed bilateral pulmonary emboli without evidence of right ventricular strain.  There was subsegmental pulmonary emboli in the posterior medial and posterior right lower lobe.  Occlusive emboli in the medial segmental branch of the right lower lobe.  Nonocclusive PE in segmental and subsegmental branches of the left upper lobe.  Small amount of subsegmental emboli in the posterior basilar segment of the left lower lobe.    She was started on Eliquis.  Since then she has been on Eliquis. She is a chronic smoker and has some dyspnea on exertion.  Overall breathing has improved significantly since the time of diagnosis of COVID/PE.  She is now off oxygen.  Tolerating Eliquis well.  No bleeding.  Has never had a clot before.  She gets leyla horses in the legs. Has some leg swelling bilaterally.  This has been going on for  several months.  She has chronic constipation.      ROS:  A comprehensive ROS was otherwise neg      Past Medical/Surgical History:  Past Medical History:   Diagnosis Date     History of pulmonary embolism     after severe Covid infection - on Eliquis     Lumbago    Hypothyroidism.  Depression/anxiety.  Hyperlipidemia.    Past Surgical History:   Procedure Laterality Date     HYSTERECTOMY VAGINAL  03/04/2009     NO HISTORY OF SURGERY       Allergies:  Allergies as of 10/12/2022 - Reviewed 10/12/2022   Allergen Reaction Noted     Pcn [penicillins] Rash 09/30/2007     Current Medications:  Current Outpatient Medications   Medication Sig Dispense Refill     apixaban ANTICOAGULANT (ELIQUIS) 5 MG tablet Take 1 tablet (5 mg) by mouth 2 times daily 180 tablet 1     Apixaban Starter Pack 5 MG TBPK Take 10 mg (two tablets) by mouth twice daily for 7 days, then 5 mg (one tablet) by mouth twice daily.       atorvastatin (LIPITOR) 10 MG tablet Take 1 tablet (10 mg) by mouth daily 30 tablet 0     IBUPROFEN 200 MG OR TABS 1 TABLET EVERY 4 TO 6 HOURS AS NEEDED       levothyroxine (SYNTHROID/LEVOTHROID) 50 MCG tablet Take 1 tablet (50 mcg) by mouth daily 30 tablet 0     PARoxetine (PAXIL) 20 MG tablet Take 1 tablet (20 mg) by mouth daily 90 tablet 1     traZODone (DESYREL) 150 MG tablet TAKE ONE TABLET BY MOUTH AT BEDTIME 90 tablet 2     vitamin D3 (VITAMIN D3) 50 mcg (2000 units) tablet Take 1 tablet (50 mcg) by mouth daily 90 tablet 1      Family History:  Family History   Problem Relation Age of Onset     Cancer Mother         lung     Diabetes Father      Cancer Father         skin     Mother had lung cancer and she developed blood clots     Social History:  Social History     Socioeconomic History     Marital status:      Spouse name: Not on file     Number of children: Not on file     Years of education: Not on file     Highest education level: Not on file   Occupational History     Not on file   Tobacco Use      Smoking status: Every Day     Packs/day: 1.00     Years: 20.00     Pack years: 20.00     Types: Cigarettes     Smokeless tobacco: Never     Tobacco comments:     patient wuit for 27 years and started again    Vaping Use     Vaping Use: Never used   Substance and Sexual Activity     Alcohol use: No     Drug use: No     Sexual activity: Yes     Partners: Male   Other Topics Concern      Service No     Blood Transfusions No     Caffeine Concern No     Occupational Exposure No     Hobby Hazards No     Sleep Concern No     Stress Concern No     Weight Concern No     Special Diet No     Back Care No     Exercise No     Bike Helmet No     Seat Belt Yes     Self-Exams No     Parent/sibling w/ CABG, MI or angioplasty before 65F 55M? No   Social History Narrative     Not on file     Social Determinants of Health     Financial Resource Strain: Not on file   Food Insecurity: Not on file   Transportation Needs: Not on file   Physical Activity: Not on file   Stress: Not on file   Social Connections: Not on file   Intimate Partner Violence: Not on file   Housing Stability: Not on file   smokes 1 ppd. Just picked up nicotine to help quit. No etoh. She lives with son who has Down Syndrome.  Physical Exam:  /67 (BP Location: Left arm, Patient Position: Sitting)   Pulse 76   Temp 98.3  F (36.8  C) (Oral)   Resp 20   Wt 75.4 kg (166 lb 3.2 oz)   LMP 10/15/2007   SpO2 96%   BMI 30.40 kg/m     Wt Readings from Last 4 Encounters:   10/12/22 75.4 kg (166 lb 3.2 oz)   08/12/22 73.5 kg (162 lb)   04/05/22 76.7 kg (169 lb 3.2 oz)   03/24/22 75.3 kg (166 lb)       CONSTITUTIONAL: no acute distress  EYES: PERRLA, no palor or icterus.   ENT/MOUTH: no mouth lesions. Ears normal  CVS: s1s2 no m r g .   RESPIRATORY: clear to auscultation b/l  GI: soft non tender no hepatosplenomegaly  NEURO: AAOX3  Grossly non focal neuro exam  INTEGUMENT: no obvious rashes  LYMPHATIC: no palpable cervical, supraclavicular, axillary or inguinal  LAD  MUSCULOSKELETAL: Unremarkable. No bony tenderness.   EXTREMITIES: Trace bilateral edema  PSYCH: Mentation, mood and affect are normal. Decision making capacity is intact    Laboratory/Imaging Studies  Reviewed    3/9/2022.    CBC/differential was unremarkable.    Chemistry was unremarkable  TSH was normal.    ASSESSMENT/PLAN:    She developed bilateral pulmonary embolism without right ventricular strain after a serious bout of COVID infection in February/March 2022.  She has been on Eliquis since 3/8/2022.  She has completed 7 months of systemic anticoagulation.  Due to provoked nature of pulmonary embolism, I believe it is reasonable to stop systemic anticoagulation now that she has completed 7 months of therapy.  As venous thromboembolism usually start with DVT in the legs and then goes to the lung, I would also recommend to get baseline bilateral lower extremity ultrasound to make sure that there is no residual chronic clots there.    We discussed that in the future, at times of increased risk of development of blood clots like bedbound illnesses, hospitalizations, long travels, it would be reasonable to get DVT Prophylaxis to prevent development of another blood clot.    I also advised her to follow with PCP and be up to date with regards to cancer screening.    Smoking.  He has been a chronic smoker and smokes 1 pack a day.  We discussed importance of smoking cessation.  She mentioned that she recently bought nicotine and she we will try to quit smoking.  Encouraged her to work hard on this.    Going forward she will follow with PCP and see me on an as needed basis.    All questions answered to her satisfaction.  She is agreeable and comfortable with the plan.    Pola Wu MD                         Again, thank you for allowing me to participate in the care of your patient.        Sincerely,        Pola Wu MD

## 2022-10-12 NOTE — PROGRESS NOTES
Hematology initial visit:  Date on this visit: 10/12/2022    Sapphire Vivar  is referred by Dr.Islam Orr for a hematology consultation. She requires evaluation for pulmonary embolism.  Primary Physician: No Ref-Primary, Physician     History Of Present Illness:  Ms. Vivar is a 62 year old female who presents with pulmonary embolism.      She was diagnosed with COVID pneumonia in February 2022.  She was admitted from 2/22/2022 - 2/24/2022 for acute hypoxic respiratory insufficiency due to COVID.  She was tested positive for COVID-19 on February 15.  She was treated with the steroids and at that time CT PE was negative for pulmonary embolism.  She required supplemental oxygen and was discharged on home oxygen.    She presented on 3/8/2022 for right-sided pleuritic chest and back pain.  Repeat CT chest PE protocol was done and it showed bilateral pulmonary emboli without evidence of right ventricular strain.  There was subsegmental pulmonary emboli in the posterior medial and posterior right lower lobe.  Occlusive emboli in the medial segmental branch of the right lower lobe.  Nonocclusive PE in segmental and subsegmental branches of the left upper lobe.  Small amount of subsegmental emboli in the posterior basilar segment of the left lower lobe.    She was started on Eliquis.  Since then she has been on Eliquis. She is a chronic smoker and has some dyspnea on exertion.  Overall breathing has improved significantly since the time of diagnosis of COVID/PE.  She is now off oxygen.  Tolerating Eliquis well.  No bleeding.  Has never had a clot before.  She gets leyla horses in the legs. Has some leg swelling bilaterally.  This has been going on for several months.  She has chronic constipation.      ROS:  A comprehensive ROS was otherwise neg      Past Medical/Surgical History:  Past Medical History:   Diagnosis Date     History of pulmonary embolism     after severe Covid infection - on Eliquis     Lumbago     Hypothyroidism.  Depression/anxiety.  Hyperlipidemia.    Past Surgical History:   Procedure Laterality Date     HYSTERECTOMY VAGINAL  03/04/2009     NO HISTORY OF SURGERY       Allergies:  Allergies as of 10/12/2022 - Reviewed 10/12/2022   Allergen Reaction Noted     Pcn [penicillins] Rash 09/30/2007     Current Medications:  Current Outpatient Medications   Medication Sig Dispense Refill     apixaban ANTICOAGULANT (ELIQUIS) 5 MG tablet Take 1 tablet (5 mg) by mouth 2 times daily 180 tablet 1     Apixaban Starter Pack 5 MG TBPK Take 10 mg (two tablets) by mouth twice daily for 7 days, then 5 mg (one tablet) by mouth twice daily.       atorvastatin (LIPITOR) 10 MG tablet Take 1 tablet (10 mg) by mouth daily 30 tablet 0     IBUPROFEN 200 MG OR TABS 1 TABLET EVERY 4 TO 6 HOURS AS NEEDED       levothyroxine (SYNTHROID/LEVOTHROID) 50 MCG tablet Take 1 tablet (50 mcg) by mouth daily 30 tablet 0     PARoxetine (PAXIL) 20 MG tablet Take 1 tablet (20 mg) by mouth daily 90 tablet 1     traZODone (DESYREL) 150 MG tablet TAKE ONE TABLET BY MOUTH AT BEDTIME 90 tablet 2     vitamin D3 (VITAMIN D3) 50 mcg (2000 units) tablet Take 1 tablet (50 mcg) by mouth daily 90 tablet 1      Family History:  Family History   Problem Relation Age of Onset     Cancer Mother         lung     Diabetes Father      Cancer Father         skin     Mother had lung cancer and she developed blood clots     Social History:  Social History     Socioeconomic History     Marital status:      Spouse name: Not on file     Number of children: Not on file     Years of education: Not on file     Highest education level: Not on file   Occupational History     Not on file   Tobacco Use     Smoking status: Every Day     Packs/day: 1.00     Years: 20.00     Pack years: 20.00     Types: Cigarettes     Smokeless tobacco: Never     Tobacco comments:     patient wuit for 27 years and started again    Vaping Use     Vaping Use: Never used   Substance and Sexual  Activity     Alcohol use: No     Drug use: No     Sexual activity: Yes     Partners: Male   Other Topics Concern      Service No     Blood Transfusions No     Caffeine Concern No     Occupational Exposure No     Hobby Hazards No     Sleep Concern No     Stress Concern No     Weight Concern No     Special Diet No     Back Care No     Exercise No     Bike Helmet No     Seat Belt Yes     Self-Exams No     Parent/sibling w/ CABG, MI or angioplasty before 65F 55M? No   Social History Narrative     Not on file     Social Determinants of Health     Financial Resource Strain: Not on file   Food Insecurity: Not on file   Transportation Needs: Not on file   Physical Activity: Not on file   Stress: Not on file   Social Connections: Not on file   Intimate Partner Violence: Not on file   Housing Stability: Not on file   smokes 1 ppd. Just picked up nicotine to help quit. No etoh. She lives with son who has Down Syndrome.  Physical Exam:  /67 (BP Location: Left arm, Patient Position: Sitting)   Pulse 76   Temp 98.3  F (36.8  C) (Oral)   Resp 20   Wt 75.4 kg (166 lb 3.2 oz)   LMP 10/15/2007   SpO2 96%   BMI 30.40 kg/m     Wt Readings from Last 4 Encounters:   10/12/22 75.4 kg (166 lb 3.2 oz)   08/12/22 73.5 kg (162 lb)   04/05/22 76.7 kg (169 lb 3.2 oz)   03/24/22 75.3 kg (166 lb)       CONSTITUTIONAL: no acute distress  EYES: PERRLA, no palor or icterus.   ENT/MOUTH: no mouth lesions. Ears normal  CVS: s1s2 no m r g .   RESPIRATORY: clear to auscultation b/l  GI: soft non tender no hepatosplenomegaly  NEURO: AAOX3  Grossly non focal neuro exam  INTEGUMENT: no obvious rashes  LYMPHATIC: no palpable cervical, supraclavicular, axillary or inguinal LAD  MUSCULOSKELETAL: Unremarkable. No bony tenderness.   EXTREMITIES: Trace bilateral edema  PSYCH: Mentation, mood and affect are normal. Decision making capacity is intact    Laboratory/Imaging Studies  Reviewed    3/9/2022.    CBC/differential was  unremarkable.    Chemistry was unremarkable  TSH was normal.    ASSESSMENT/PLAN:    She developed bilateral pulmonary embolism without right ventricular strain after a serious bout of COVID infection in February/March 2022.  She has been on Eliquis since 3/8/2022.  She has completed 7 months of systemic anticoagulation.  Due to provoked nature of pulmonary embolism, I believe it is reasonable to stop systemic anticoagulation now that she has completed 7 months of therapy.  As venous thromboembolism usually start with DVT in the legs and then goes to the lung, I would also recommend to get baseline bilateral lower extremity ultrasound to make sure that there is no residual chronic clots there.    We discussed that in the future, at times of increased risk of development of blood clots like bedbound illnesses, hospitalizations, long travels, it would be reasonable to get DVT Prophylaxis to prevent development of another blood clot.    I also advised her to follow with PCP and be up to date with regards to cancer screening.    Smoking.  He has been a chronic smoker and smokes 1 pack a day.  We discussed importance of smoking cessation.  She mentioned that she recently bought nicotine and she we will try to quit smoking.  Encouraged her to work hard on this.    Going forward she will follow with PCP and see me on an as needed basis.    All questions answered to her satisfaction.  She is agreeable and comfortable with the plan.    Pola Wu MD

## 2022-10-12 NOTE — NURSING NOTE
"Oncology Rooming Note    October 12, 2022 2:10 PM   Sapphire Vivar is a 62 year old female who presents for:    Chief Complaint   Patient presents with     Oncology Clinic Visit     Initial Vitals: /67 (BP Location: Left arm, Patient Position: Sitting)   Pulse 76   Temp 98.3  F (36.8  C) (Oral)   Resp 20   Wt 75.4 kg (166 lb 3.2 oz)   LMP 10/15/2007   SpO2 96%   BMI 30.40 kg/m   Estimated body mass index is 30.4 kg/m  as calculated from the following:    Height as of 8/12/22: 1.575 m (5' 2\").    Weight as of this encounter: 75.4 kg (166 lb 3.2 oz). Body surface area is 1.82 meters squared.  No Pain (0) Comment: Data Unavailable   Patient's last menstrual period was 10/15/2007.  Allergies reviewed: Yes  Medications reviewed: Yes    Medications: Medication refills not needed today.  Pharmacy name entered into [facility]: [id number] - [place], [place] - [address]              "

## 2022-11-01 ENCOUNTER — ANCILLARY PROCEDURE (OUTPATIENT)
Dept: ULTRASOUND IMAGING | Facility: CLINIC | Age: 62
End: 2022-11-01
Attending: INTERNAL MEDICINE
Payer: COMMERCIAL

## 2022-11-01 DIAGNOSIS — E78.5 DYSLIPIDEMIA: ICD-10-CM

## 2022-11-01 DIAGNOSIS — M79.89 LEG SWELLING: ICD-10-CM

## 2022-11-01 DIAGNOSIS — E03.9 ACQUIRED HYPOTHYROIDISM: ICD-10-CM

## 2022-11-01 DIAGNOSIS — I26.99 BILATERAL PULMONARY EMBOLISM (H): ICD-10-CM

## 2022-11-01 PROCEDURE — 93970 EXTREMITY STUDY: CPT | Mod: TC | Performed by: RADIOLOGY

## 2022-11-01 RX ORDER — LEVOTHYROXINE SODIUM 50 UG/1
50 TABLET ORAL DAILY
Qty: 30 TABLET | Refills: 1 | Status: SHIPPED | OUTPATIENT
Start: 2022-11-01 | End: 2023-01-04

## 2022-11-01 RX ORDER — ATORVASTATIN CALCIUM 10 MG/1
10 TABLET, FILM COATED ORAL DAILY
Qty: 30 TABLET | Refills: 1 | Status: SHIPPED | OUTPATIENT
Start: 2022-11-01 | End: 2023-01-04

## 2022-12-20 ENCOUNTER — OFFICE VISIT (OUTPATIENT)
Dept: URGENT CARE | Facility: URGENT CARE | Age: 62
End: 2022-12-20
Payer: COMMERCIAL

## 2022-12-20 VITALS
RESPIRATION RATE: 26 BRPM | OXYGEN SATURATION: 92 % | SYSTOLIC BLOOD PRESSURE: 129 MMHG | WEIGHT: 164 LBS | HEART RATE: 82 BPM | BODY MASS INDEX: 30 KG/M2 | TEMPERATURE: 97.7 F | DIASTOLIC BLOOD PRESSURE: 77 MMHG

## 2022-12-20 DIAGNOSIS — R06.02 SHORTNESS OF BREATH: Primary | ICD-10-CM

## 2022-12-20 DIAGNOSIS — Z86.711 HISTORY OF PULMONARY EMBOLISM: ICD-10-CM

## 2022-12-20 DIAGNOSIS — Z72.0 TOBACCO ABUSE: ICD-10-CM

## 2022-12-20 PROCEDURE — 99215 OFFICE O/P EST HI 40 MIN: CPT | Performed by: NURSE PRACTITIONER

## 2022-12-20 ASSESSMENT — PAIN SCALES - GENERAL: PAINLEVEL: MODERATE PAIN (5)

## 2022-12-20 NOTE — PROGRESS NOTES
Assessment & Plan     Shortness of breath      History of pulmonary embolism      Tobacco abuse       With severe shortness of breath that feels like prior PE, tobacco abuse, history of PE recommend further evaluation in emergency room as cannot rule out PE in urgent care and can be life threatening. Patient wonders about her rash stating she will not go to ED. Discussed will not treat rash in urgent care due to concern for life threatening PE that needs further evaluation with oxygen 92%. Patient declines ambulance. She is discharged in stable condition.           Tia Cox NP  [facility]    Raysa Leary is a 62 year old female who presents to clinic today with her son for the following health issues:  Chief Complaint   Patient presents with     Breathing Problem     1 week +     Derm Problem     Shingles? chest     Patient presents for evaluation of shortness of breath for the past 4-5 days. Shortness of breath is severe and feels like when she had pulmonary emboli after COVID. She had fever, chills, cough, headache last week which resolved. Denies blurred vision, numbness, tingling. She states she always has swelling in her legs and hasn't paid attention to her legs. She gets winded even with walking short distances.     Also, she has a rash on her chest and wonders if it is shingles?     She has a history of bilateral PE and was anticoagulated with eliquis, stopped about a month ago. She has a history of tobacco abuse, currently smokes 1ppd.      Problem list, Medication list, Allergies, and Medical history reviewed in EPIC.    ROS:  Review of systems negative except for noted above        Objective    /77   Pulse 82   Temp 97.7  F (36.5  C) (Tympanic)   Resp 26   Wt 74.4 kg (164 lb)   LMP 10/15/2007   SpO2 92%   BMI 30.00 kg/m    Physical Exam  Constitutional:       General: She is not in acute distress.     Appearance: She is not toxic-appearing or  diaphoretic.   HENT:      Head: Normocephalic and atraumatic.      Nose: Nose normal.      Mouth/Throat:      Mouth: Mucous membranes are moist.      Pharynx: Oropharynx is clear. No oropharyngeal exudate or posterior oropharyngeal erythema.   Eyes:      Extraocular Movements: Extraocular movements intact.      Conjunctiva/sclera: Conjunctivae normal.      Pupils: Pupils are equal, round, and reactive to light.   Cardiovascular:      Rate and Rhythm: Normal rate and regular rhythm.      Heart sounds: Normal heart sounds.   Pulmonary:      Effort: No respiratory distress.      Breath sounds: Wheezing present. No rhonchi or rales.      Comments: Tachypnea, increased respiratory effort  Neurological:      Mental Status: She is alert.

## 2023-01-04 DIAGNOSIS — E03.9 ACQUIRED HYPOTHYROIDISM: ICD-10-CM

## 2023-01-04 DIAGNOSIS — E78.5 DYSLIPIDEMIA: ICD-10-CM

## 2023-01-04 RX ORDER — ATORVASTATIN CALCIUM 10 MG/1
10 TABLET, FILM COATED ORAL DAILY
Qty: 30 TABLET | Refills: 0 | Status: SHIPPED | OUTPATIENT
Start: 2023-01-04 | End: 2023-01-11

## 2023-01-04 RX ORDER — LEVOTHYROXINE SODIUM 50 UG/1
50 TABLET ORAL DAILY
Qty: 30 TABLET | Refills: 0 | Status: SHIPPED | OUTPATIENT
Start: 2023-01-04 | End: 2023-03-08

## 2023-01-11 ENCOUNTER — OFFICE VISIT (OUTPATIENT)
Dept: BEHAVIORAL HEALTH | Facility: CLINIC | Age: 63
End: 2023-01-11
Payer: COMMERCIAL

## 2023-01-11 ENCOUNTER — TELEPHONE (OUTPATIENT)
Dept: BEHAVIORAL HEALTH | Facility: CLINIC | Age: 63
End: 2023-01-11

## 2023-01-11 VITALS
HEART RATE: 103 BPM | BODY MASS INDEX: 31.1 KG/M2 | SYSTOLIC BLOOD PRESSURE: 119 MMHG | DIASTOLIC BLOOD PRESSURE: 76 MMHG | WEIGHT: 169 LBS | HEIGHT: 62 IN

## 2023-01-11 DIAGNOSIS — F17.200 TOBACCO USE DISORDER: ICD-10-CM

## 2023-01-11 DIAGNOSIS — F11.20 OPIOID USE DISORDER, SEVERE, DEPENDENCE (H): Primary | ICD-10-CM

## 2023-01-11 PROCEDURE — 99205 OFFICE O/P NEW HI 60 MIN: CPT | Performed by: FAMILY MEDICINE

## 2023-01-11 RX ORDER — BUPRENORPHINE AND NALOXONE 8; 2 MG/1; MG/1
1 FILM, SOLUBLE BUCCAL; SUBLINGUAL 3 TIMES DAILY
Qty: 90 FILM | Refills: 0 | Status: SHIPPED | OUTPATIENT
Start: 2023-01-11 | End: 2023-03-01

## 2023-01-11 RX ORDER — ALBUTEROL SULFATE 90 UG/1
1-2 AEROSOL, METERED RESPIRATORY (INHALATION)
Status: CANCELLED
Start: 2023-01-11

## 2023-01-11 RX ORDER — LIDOCAINE HYDROCHLORIDE 10 MG/ML
2 INJECTION, SOLUTION EPIDURAL; INFILTRATION; INTRACAUDAL; PERINEURAL ONCE
Status: CANCELLED | OUTPATIENT
Start: 2023-01-11 | End: 2023-01-11

## 2023-01-11 RX ORDER — MEPERIDINE HYDROCHLORIDE 25 MG/ML
25 INJECTION INTRAMUSCULAR; INTRAVENOUS; SUBCUTANEOUS EVERY 30 MIN PRN
Status: CANCELLED | OUTPATIENT
Start: 2023-01-11

## 2023-01-11 RX ORDER — DIPHENHYDRAMINE HYDROCHLORIDE 50 MG/ML
50 INJECTION INTRAMUSCULAR; INTRAVENOUS
Status: CANCELLED
Start: 2023-01-11

## 2023-01-11 RX ORDER — ALBUTEROL SULFATE 0.83 MG/ML
2.5 SOLUTION RESPIRATORY (INHALATION)
Status: CANCELLED | OUTPATIENT
Start: 2023-01-11

## 2023-01-11 RX ORDER — METHYLPREDNISOLONE SODIUM SUCCINATE 125 MG/2ML
125 INJECTION, POWDER, LYOPHILIZED, FOR SOLUTION INTRAMUSCULAR; INTRAVENOUS
Status: CANCELLED
Start: 2023-01-11

## 2023-01-11 RX ORDER — EPINEPHRINE 1 MG/ML
0.3 INJECTION, SOLUTION, CONCENTRATE INTRAVENOUS EVERY 5 MIN PRN
Status: CANCELLED | OUTPATIENT
Start: 2023-01-11

## 2023-01-11 ASSESSMENT — PATIENT HEALTH QUESTIONNAIRE - PHQ9: SUM OF ALL RESPONSES TO PHQ QUESTIONS 1-9: 6

## 2023-01-11 NOTE — TELEPHONE ENCOUNTER
Pt is interested in starting Sublocade.   Please obtain insurance authorization.  She has [employer].     - I am certified to treat addictions under DATA 2000 waiver, XDEA # [id number]  - I have reviewed recommendations for comprehensive treatment plan with the patient  - I have reviewed the patient's medications comprehensively  and provided education to the patient on risks associated with concurrent use of benzodiazepines, alcohol, other sedatives with opioids  - I have recommended concomitant psychosocial support  - I have complied with all aspects of REMS program for Sublocade. [facility] where Sublocade will be administered is in compliance with all aspects of REMS program.   - Patient meets DSM-5 criteria for moderate or severe opioid use disorder  - Patient has taken buprenorphine 24mg/day for >1 week  - Patient will discontinue sublingual buprenorphine when steady state achieved after starting Sublocade  - Patient does not have severe hepatic impairment.   - Patient does not have a history of long QT syndrome  - Patient does not take any antiarrhythmic medications or other medications known to significantly prolong QT interval   - Urine Drug Screen on 1/11/23 was positive for buprenorphine  - Patient will not be receiving methadone while on Sublocade  - Patient will not be receiving any other long acting products for the treatment of opioid use disorder while on Sublocade

## 2023-01-11 NOTE — PROGRESS NOTES
[facility] Initial Visit    ASSESSMENT/PLAN                                                      1. Opioid use disorder, severe, dependence (H)  63 yo female w/ ~15yr history of heroin use, last use ~2018.  Has been on buprenorphine since then, first from [facility] then for the last 4 years from nonprescription sources.  Symptoms controlled with current treatment.  Wants to transfer to Sublocade.   Continue SL buprenorphine 24mg/day for now.   Can start Sublocade when authorized by insurance.  Discontinue scheduled SL buprenorphine after first Sublocade.  Will follow closely after first Sublocade to determine need for treatment of symptoms until steady state achieved with XR buprenorphine  CMP, HIV ag/ab, HCV ab ordered to be drawn with first Sublocade  Pt was given contact information for infusion center to schedule Sublocade.   Recommend she fill rx for naloxone and keep this readily available.   - buprenorphine HCl-naloxone HCl (SUBOXONE) 8-2 MG per film; Place 1 Film under the tongue 3 times daily  Dispense: 90 Film; Refill: 0  - naloxone (NARCAN) 4 MG/0.1ML nasal spray; Spray 1 spray (4 mg) into one nostril alternating nostrils once as needed for opioid reversal every 2-3 minutes until assistance arrives  Dispense: 0.2 mL; Refill: 11    2. Tobacco use disorder  Evaluate pt's goals next visit     Return in about 4 weeks (around 2/8/2023) for Follow up, in person.    Patient counseling completed today:  Discussed mechanism of action, potential risks/benefits/side effects of medications and other recommendations above.      Harm reduction counseling including availability of naloxone  Discussed importance of avoiding isolation, building a network of supportive relationships, avoiding people/places/things associated with past use to reduce risk of relapse    SUBJECTIVE                                                      CC/HPI:  Sapphire Vivar is a 62 year old female with PMH PE 2022,  "hypothyroidism, anxiety, depression, tobacco use disorder, and opioid use disorder on buprenorphine who presents to the [facility] for initial visit.      Brief History:  Sapphire Vivar was first seen in Recovery Clinic on 01/11/23. They were referred by a friend.   Patient's reasons for seeking treatment on this date include wanting to transfer from SL buprenorphine to XR buprenorphine.     Substance Use History :  Opioids:   Age at first use: late 40s  Current use: substance: heroin; quantity \"a lot\"; route: insufflation ; timing of last use: 2018;  First started buprenorphine through [facility] 2018, then began purchasing from nonprescription sources after missing an appointment due to problems arising while visiting her sister that prolonged her visit.  Has taken 24mg buprenorphine/day since 2019.      IV drug use: No   History of overdose: No  Previous residential or outpatient treatments for addiction : Yes: 3 times  Previous medication treatments for addiction: Yes: Suboxone  Longest period of sobriety: currently  Medical complications related to substance use: denies  Hepatitis C: negative per pt; Date of most recent testing: no record of testing  HIV: negative per pt; Date of most recent testing: no record of testing    DSM-5 OUD criteria met:  Taken in larger amounts/greater time spent in behavior over longer period of time than intended  Persistent desire or unsuccessful efforts to cut down or control use/behavior  A great deal of time is spent in activities necessary to obtain the substance/participate in the behavior or recover from its effects  Cravings  Continued use/behavior despite having persistent or recurrent social or interpersonal problems caused or exacerbated by effects of use/behavior   Important social, occupational, or recreational activities are given up or reduced because of use/behavior  Tolerance    Withdrawal    Other Addiction History:  Stimulants   Cocaine in " 20s  Sedatives/hypnotics/anxiolytics:   denies  Alcohol:   denies  Nicotine:   1 pack daily  Cannabis:   denies  Hallucinogens/Dissociatives:   denies  Eating disorder:  denies  Gambling:   denies        [place] Prescription Drug Monitoring Program Reviewed:  Yes; as expected        Past Medical History:   Diagnosis Date     Anxiety and depression      History of pulmonary embolism     after severe Covid infection - on Eliquis     Hypothyroidism      Lumbago      Opioid use disorder      Tobacco use disorder          PAST PSYCHIATRIC HISTORY:  Diagnoses- anxiety/depression  Suicide Attempts: No   Hospitalizations: No     PHQ 3/3/2022 6/27/2022 1/11/2023   PHQ-9 Total Score 3 4 6   Q9: Thoughts of better off dead/self-harm past 2 weeks Not at all Not at all Not at all         If PHQ-9 score of 15 or higher, has [facility] therapist or provider been notified? N/A    Any current suicidal ideation? No  If yes, has [facility] therapist or provider been notified? N/A    Mental health provider: denies; had been prescribed paroxetine by PCP    Past Surgical History:   Procedure Laterality Date     HYSTERECTOMY VAGINAL  03/04/2009     NO HISTORY OF SURGERY         Medications:  levothyroxine (SYNTHROID/LEVOTHROID) 50 MCG tablet, Take 1 tablet (50 mcg) by mouth daily  PARoxetine (PAXIL) 20 MG tablet, Take 1 tablet (20 mg) by mouth daily  traZODone (DESYREL) 150 MG tablet, TAKE ONE TABLET BY MOUTH AT BEDTIME  vitamin D3 (VITAMIN D3) 50 mcg (2000 units) tablet, Take 1 tablet (50 mcg) by mouth daily    No current facility-administered medications on file prior to visit.      Allergies   Allergen Reactions     Pcn [Penicillins] Rash       Family History   Problem Relation Age of Onset     Cancer Mother         lung     Diabetes Father      Cancer Father         skin         Social History  Housing status: with son,  Whom she takes care of he has down syndrome   Employment status: Employed full time  Relationship  "status: Single  Children: 4 children all adults  Legal: denies  Insurance needs: active  Contact information up to date? yes    3rd Party Involvement none today (please obtain SASHA if pt would like to include)    REVIEW OF SYSTEMS:  No withdrawal symptoms currently  Denies cravings for opioids  Takes OTC supplements to address OIC    OBJECTIVE                                                      /76   Pulse 103   Ht 1.575 m (5' 2\")   Wt 76.7 kg (169 lb)   LMP 10/15/2007   BMI 30.91 kg/m      Physical Exam  HENT:      Head: Normocephalic and atraumatic.      Nose: No rhinorrhea.   Eyes:      General: No scleral icterus.     Extraocular Movements: Extraocular movements intact.      Conjunctiva/sclera: Conjunctivae normal.   Pulmonary:      Effort: Pulmonary effort is normal.   Neurological:      Mental Status: She is alert and oriented to person, place, and time.      Coordination: Coordination is intact.      Gait: Gait is intact.   Psychiatric:         Attention and Perception: Attention and perception normal.         Mood and Affect: Mood and affect normal.         Speech: Speech normal.         Behavior: Behavior is cooperative.         Thought Content: Thought content normal.         Labs:    UDS: Urine Drug Screen Results  AMP: Negative  BAR: Negative  BUP: Positive  BZO: Negative  THEODORA: Negative  mAMP: Negative  MDMA : Negative  MTD: Negative  [id number]: Negative  OXY: Negative  PCP : Negative  THC : Negative  *POC urine drug screen does not screen for Fentanyl    No results found for this or any previous visit (from the past 720 hour(s)).            At least 60 min spent in review of medical record,  review, obtaining histories, discussing recommendations, counseling, providing support.      Gema Zhang MD  Addiction Medicine  [place] [id number] [address]  [phone]          "

## 2023-01-16 NOTE — TELEPHONE ENCOUNTER
Informed patient that Sublocade prior authorization was approved. Provided scheduling # for [facility], [phone]. Reiterated to patient the importance of taking buprenorphine as prescribed without opiate use for at least 7 days leading up to Sublocade injection. Also asked patient to follow up with Recovery Clinic after injection. Patient verbalized understanding.    Ena Murray, CMA

## 2023-02-01 DIAGNOSIS — E78.5 DYSLIPIDEMIA: ICD-10-CM

## 2023-02-03 NOTE — TELEPHONE ENCOUNTER
Medication Question or Refill    Contacts       Type Contact Phone/Fax    02/03/2023 01:37 PM CST Phone (Incoming) [id number] - [place], [place] - [address]  (Pharmacy) [phone]          What medication are you calling about (include dose and sig)?: atorvastatin (LIPITOR) 10 MG tablet     Preferred Pharmacy:   [id number] - [place], [place] - [address]   [address] [address]  Phone: [phone] Fax: [phone]      Controlled Substance Agreement on file:   CSA -- Patient Level:    CSA: None found at the patient level.       Who prescribed the medication?: Zinelin    Do you need a refill? Yes    When did you use the medication last? 01/04/2023    Patient offered an appointment? No    Do you have any questions or concerns?  No      Okay to leave a detailed message?: Yes at Home number on file [phone] ([place])

## 2023-02-06 RX ORDER — ATORVASTATIN CALCIUM 10 MG/1
10 TABLET, FILM COATED ORAL DAILY
Qty: 30 TABLET | Refills: 0 | Status: SHIPPED | OUTPATIENT
Start: 2023-02-06 | End: 2023-03-08

## 2023-02-20 ENCOUNTER — TELEPHONE (OUTPATIENT)
Dept: BEHAVIORAL HEALTH | Facility: CLINIC | Age: 63
End: 2023-02-20
Payer: COMMERCIAL

## 2023-02-20 NOTE — TELEPHONE ENCOUNTER
[facility] attempted to call patient for [facility] offer following up on PROMPT alert. [facility] left a message and will also mail patient an offer letter.     Phyllis Damon [facility]  Behavioral Health Home ([facility])   [employer]  [phone]

## 2023-02-20 NOTE — LETTER
Dear Sapphire:    I am the [facility] Social Work Care Coordinator that works closely with your Primary Care Provider (PCP), to support your healthy living goals.  This letter serves to inform you that you are eligible for Behavioral Health Home Services that is a paid service through your insurance and is free of cost to you. Behavioral Health Home ([facility]) is a program created to help integrate your primary care clinic and provide services in addition to caring for your physical health. The following are examples of topics that we can assist with if you are to enroll in Behavioral Health Home ([facility]):    - Housing Resources  - Transportation Resources   - Financial Resources  - Coordination with the [facility] for Benefits (MA, SNAP benefits, etc)  - Disability Eligibility and Benefits  - Medical Appointments and Medication Costs  - Employment and Education  - Disability Related Information and Education  - Referrals to mental health services, chemical dependency assessment/treatment, etc     I have enclosed the [facility] Handout that offers basic information of what our program entails. If you are interested in learning more about and/or enrolling in Behavioral Health Home services, you may ask your PCP to connect you with me or feel free to contact me to schedule an appointment. My contact information is listed below.    I look forward to hearing from you!        Phyllis Damon LGSW Behavioral Health Home ([facility])   [phone]  [email or web address]

## 2023-02-23 ENCOUNTER — PATIENT OUTREACH (OUTPATIENT)
Dept: FAMILY MEDICINE | Facility: CLINIC | Age: 63
End: 2023-02-23
Payer: COMMERCIAL

## 2023-02-23 NOTE — TELEPHONE ENCOUNTER
Patient Quality Outreach    Patient is due for the following:   Colon Cancer Screening  Breast Cancer Screening - Mammogram      Topic Date Due     COVID-19 Vaccine (1) Never done     Zoster (Shingles) Vaccine (1 of 2) Never done     Flu Vaccine (1) 09/01/2022       Next Steps:   Schedule a office visit for orders    Type of outreach:    Sent [employer] message.      Questions for provider review:    None     Abby Dubon, CMA

## 2023-03-01 ENCOUNTER — OFFICE VISIT (OUTPATIENT)
Dept: BEHAVIORAL HEALTH | Facility: CLINIC | Age: 63
End: 2023-03-01
Payer: COMMERCIAL

## 2023-03-01 VITALS — DIASTOLIC BLOOD PRESSURE: 72 MMHG | SYSTOLIC BLOOD PRESSURE: 116 MMHG | OXYGEN SATURATION: 94 % | HEART RATE: 80 BPM

## 2023-03-01 DIAGNOSIS — F17.200 TOBACCO USE DISORDER: ICD-10-CM

## 2023-03-01 DIAGNOSIS — F11.20 OPIOID USE DISORDER, SEVERE, DEPENDENCE (H): Primary | ICD-10-CM

## 2023-03-01 PROCEDURE — 99214 OFFICE O/P EST MOD 30 MIN: CPT | Performed by: NURSE PRACTITIONER

## 2023-03-01 RX ORDER — BUPRENORPHINE AND NALOXONE 8; 2 MG/1; MG/1
1 FILM, SOLUBLE BUCCAL; SUBLINGUAL 3 TIMES DAILY
Qty: 90 FILM | Refills: 0 | Status: SHIPPED | OUTPATIENT
Start: 2023-03-01 | End: 2023-05-12

## 2023-03-01 ASSESSMENT — PATIENT HEALTH QUESTIONNAIRE - PHQ9: SUM OF ALL RESPONSES TO PHQ QUESTIONS 1-9: 7

## 2023-03-01 NOTE — NURSING NOTE
[facility]      Rooming information:  Approximate last use of full opioid agonist: over 5 years ago   Taking buprenorphine? Yes: Had to buy some off the street As prescribed? Yes: Takes 3 - 8 mg films  Number of buprenorphine films/tablets remaining currently: 0  Side effects related to buprenorphine (constipation, dry mouth, sedation?) No   Narcan currently available: Yes  Other recent substance use:    Denies  NICOTINE-Yes: smoke a pack per day  If using nicotine, ready to quit? Yes: Has some gum at home    Point of care urine drug screen positive for:  Urine Drug Screen Results  AMP: Negative  BAR: Negative  BUP: Positive  BZO: Negative  THEODORA: Negative  mAMP: Negative  MDMA : Negative  MTD: Negative  IMQ416: Negative  OXY: Negative  PCP : Negative  THC : Negative  *POC urine drug screen does not screen for Fentanyl    Pregnancy Status   LMP: Hysterectomy  Urine pregnancy test specimen obtained and sent to lab:No    PHQ Assesment Total Score(s) 3/1/2023   PHQ-9 Score 7   Some recent data might be hidden       If PHQ-9 score of 15 or higher, has Recovery Clinic therapist or provider been notified? N/A    Any current suicidal ideation? No  If yes, has [facility] therapist or provider been notified? N/A    Primary care provider: Physician No Ref-Primary     Mental health provider: None (follow up on MH referral if needed)    Insurance needs: Active    Housing needs: Stable    Contact information up to date? Yes    3rd Party Involvement: None today (please obtain SASHA if pt would like to include)    Liliya Del Toro RN  March 1, 2023  1:22 PM

## 2023-03-01 NOTE — PROGRESS NOTES
"[facility] Follow Up    ASSESSMENT/PLAN                                                      1. Opioid use disorder, severe, dependence (H)  - Continue Suboxone 8 mg TID until initial Sublocade injection. Discontinue Suboxone 24 hrous after initial injection. Reviewed pt may require supplemental buprenorphine after initial injection, OK to take 4-8 mg after initial injection as needed for cravings or withdrawal symptoms. Pt to follow up in clinic after initial injection.   - AST and ALT lab orders placed on 1/11/23 per Dr. Zhang.   - confirms access to narcan   - buprenorphine HCl-naloxone HCl (SUBOXONE) 8-2 MG per film; Place 1 Film under the tongue 3 times daily  Dispense: 90 Film; Refill: 0    2. Tobacco use disorder  - contemplation stage of change, reviewed use of NRT as below.   - nicotine polacrilex (NICORETTE) 4 MG gum; Place 1 each (4 mg) inside cheek every hour as needed for smoking cessation  Dispense: 240 each; Refill: 1       Return in about 4 weeks (around 3/29/2023) for Follow up, with any available provider, in person.    SUBJECTIVE                                                        CC/HPI:  Sapphire Vivar is a 62 year old female with PMH PE 2022, hypothyroidism, anxiety, depression, tobacco use disorder, and opioid use disorder on buprenorphine who presents to the [facility] for follow up.      Brief History:  Sapphire Vivar was first seen in Recovery Clinic on 01/11/23. They were referred by a friend.   Patient's reasons for seeking treatment on this date include wanting to transfer from SL buprenorphine to XR buprenorphine.      Substance Use History:  Opioids:   Age at first use: late 40s  Current use: substance: heroin; quantity \"a lot\"; route: insufflation ; timing of last use: 2018;  First started buprenorphine through [facility] 2018, then began purchasing from nonprescription sources after missing an appointment due to problems arising while visiting " her sister that prolonged her visit.  Has taken 24mg buprenorphine/day since 2019.                 IV drug use: No   History of overdose: No  Previous residential or outpatient treatments for addiction : Yes: 3 times  Previous medication treatments for addiction: Yes: Suboxone  Longest period of sobriety: currently  Medical complications related to substance use: denies  Hepatitis C: negative per pt; Date of most recent testing: no record of testing  HIV: negative per pt; Date of most recent testing: no record of testing     Other Addiction History:  Stimulants   Cocaine in 20s  Sedatives/hypnotics/anxiolytics:   denies  Alcohol:   denies  Nicotine:   1 pack daily  Cannabis:   denies  Hallucinogens/Dissociatives:   denies  Eating disorder:  denies  Gambling:              denies    PAST PSYCHIATRIC HISTORY:  Diagnoses- anxiety/depression  Suicide Attempts: No   Hospitalizations: No     Social History  Housing status: with son,  Whom she takes care of he has down syndrome   Employment status: Employed full time  Relationship status: Single  Children: 4 children all adults  Legal: denies  Insurance needs: active  Contact information up to date? yes       Most recent [facility] visit 1/11/23  A/P last visit:  1. Opioid use disorder, severe, dependence (H)  61 yo female w/ ~15yr history of heroin use, last use ~2018.  Has been on buprenorphine since then, first from [place] then for the last 4 years from nonprescription sources.  Symptoms controlled with current treatment.  Wants to transfer to Sublocade.   Continue SL buprenorphine 24mg/day for now.   Can start Sublocade when authorized by insurance.  Discontinue scheduled SL buprenorphine after first Sublocade.  Will follow closely after first Sublocade to determine need for treatment of symptoms until steady state achieved with XR buprenorphine  CMP, HIV ag/ab, HCV ab ordered to be drawn with first Sublocade  Pt was given contact information for infusion center  to schedule Sublocade.   Recommend she fill rx for naloxone and keep this readily available.   - buprenorphine HCl-naloxone HCl (SUBOXONE) 8-2 MG per film; Place 1 Film under the tongue 3 times daily  Dispense: 90 Film; Refill: 0  - naloxone (NARCAN) 4 MG/0.1ML nasal spray; Spray 1 spray (4 mg) into one nostril alternating nostrils once as needed for opioid reversal every 2-3 minutes until assistance arrives  Dispense: 0.2 mL; Refill: 11     2. Tobacco use disorder  Evaluate pt's goals next visit                Return in about 4 weeks (around 2/8/2023) for Follow up, in person.       03/01/23 visit:  - here today for follow up. She wanted to start on Sublocade, she had some life events that did not allow her to schedule and start Sublocade. She has continued taking 8 mg TID. Denies adverse SE. She has been buying Suboxone from non prescription sources since running out about 1 month ago. No recent opioid use. No cravings. She feels she has strong ongoing recovery supports.   - Currently smoking 1 pk per day, considering quitting. She has been able to quit in the past. She has nicotine gum at home she plans to utilize.       [place] Prescription Drug Monitoring Program Reviewed:  Yes  01/11/2023  1   01/11/2023  Suboxone 8 Mg-2 MG SL Film  90.00  30         Medications:  atorvastatin (LIPITOR) 10 MG tablet, Take 1 tablet (10 mg) by mouth daily  levothyroxine (SYNTHROID/LEVOTHROID) 50 MCG tablet, Take 1 tablet (50 mcg) by mouth daily  PARoxetine (PAXIL) 20 MG tablet, Take 1 tablet (20 mg) by mouth daily  traZODone (DESYREL) 150 MG tablet, TAKE ONE TABLET BY MOUTH AT BEDTIME  vitamin D3 (VITAMIN D3) 50 mcg (2000 units) tablet, Take 1 tablet (50 mcg) by mouth daily  naloxone (NARCAN) 4 MG/0.1ML nasal spray, Spray 1 spray (4 mg) into one nostril alternating nostrils once as needed for opioid reversal every 2-3 minutes until assistance arrives (Patient not taking: Reported on 3/1/2023)    No current  facility-administered medications on file prior to visit.      Allergies   Allergen Reactions     Pcn [Penicillins] Rash       PMH, PSH, FamHx reviewed      OBJECTIVE                                                      /72   Pulse 80   LMP 10/15/2007   SpO2 94%   PHQ 6/27/2022 1/11/2023 3/1/2023   PHQ-9 Total Score 4 6 7   Q9: Thoughts of better off dead/self-harm past 2 weeks Not at all Not at all Not at all       Physical Exam  Constitutional:       General: She is not in acute distress.     Appearance: Normal appearance.   Pulmonary:      Effort: Pulmonary effort is normal.   Neurological:      Mental Status: She is alert and oriented to person, place, and time.   Psychiatric:         Attention and Perception: Attention and perception normal.         Mood and Affect: Mood and affect normal.         Speech: Speech normal.         Behavior: Behavior is cooperative.         Thought Content: Thought content normal.      Comments: Insight adequate judgment fair          Labs:    UDS:  03/01/23  Urine Drug Screen Results  AMP: Negative  BAR: Negative  BUP: Positive  BZO: Negative  THEODORA: Negative  mAMP: Negative  MDMA : Negative  MTD: Negative  RLF175: Negative  OXY: Negative  PCP : Negative  THC : Negative  *POC urine drug screen does not screen for Fentanyl      No results found for this or any previous visit (from the past 240 hour(s)).      Patient counseling completed today:  Discussed mechanism of action, potential risks/benefits/side effects of medications and other recommendations above.  Recommend pt keep naloxone in their possession and reviewed other aspects of harm reduction to reduce risk of overdose with return to use.   Recommended avoiding concurrent use of alcohol, benzodiazepines or other sedatives with buprenorphine or other opioids.      CRISTIAN Chavira [place] [id number] [address] [address] [phone]

## 2023-03-02 DIAGNOSIS — E03.9 ACQUIRED HYPOTHYROIDISM: ICD-10-CM

## 2023-03-02 DIAGNOSIS — E78.5 DYSLIPIDEMIA: ICD-10-CM

## 2023-03-08 RX ORDER — ATORVASTATIN CALCIUM 10 MG/1
10 TABLET, FILM COATED ORAL DAILY
Qty: 30 TABLET | Refills: 0 | Status: SHIPPED | OUTPATIENT
Start: 2023-03-08 | End: 2023-04-12

## 2023-03-08 RX ORDER — LEVOTHYROXINE SODIUM 50 UG/1
50 TABLET ORAL DAILY
Qty: 30 TABLET | Refills: 0 | Status: SHIPPED | OUTPATIENT
Start: 2023-03-08 | End: 2023-04-17

## 2023-03-13 ENCOUNTER — ANCILLARY PROCEDURE (OUTPATIENT)
Dept: GENERAL RADIOLOGY | Facility: CLINIC | Age: 63
End: 2023-03-13
Attending: FAMILY MEDICINE
Payer: COMMERCIAL

## 2023-03-13 ENCOUNTER — OFFICE VISIT (OUTPATIENT)
Dept: FAMILY MEDICINE | Facility: CLINIC | Age: 63
End: 2023-03-13
Payer: COMMERCIAL

## 2023-03-13 VITALS
OXYGEN SATURATION: 93 % | SYSTOLIC BLOOD PRESSURE: 119 MMHG | HEART RATE: 79 BPM | DIASTOLIC BLOOD PRESSURE: 81 MMHG | BODY MASS INDEX: 31.28 KG/M2 | WEIGHT: 170 LBS | TEMPERATURE: 97.6 F | RESPIRATION RATE: 16 BRPM | HEIGHT: 62 IN

## 2023-03-13 DIAGNOSIS — Z12.31 ENCOUNTER FOR SCREENING MAMMOGRAM FOR BREAST CANCER: ICD-10-CM

## 2023-03-13 DIAGNOSIS — M79.644 PAIN OF RIGHT THUMB: ICD-10-CM

## 2023-03-13 DIAGNOSIS — J96.01 ACUTE RESPIRATORY FAILURE WITH HYPOXIA (H): ICD-10-CM

## 2023-03-13 DIAGNOSIS — Z12.11 COLON CANCER SCREENING: ICD-10-CM

## 2023-03-13 DIAGNOSIS — M79.644 PAIN OF RIGHT THUMB: Primary | ICD-10-CM

## 2023-03-13 DIAGNOSIS — I26.99 BILATERAL PULMONARY EMBOLISM (H): ICD-10-CM

## 2023-03-13 DIAGNOSIS — F33.1 MODERATE EPISODE OF RECURRENT MAJOR DEPRESSIVE DISORDER (H): ICD-10-CM

## 2023-03-13 PROCEDURE — 73140 X-RAY EXAM OF FINGER(S): CPT | Mod: TC | Performed by: RADIOLOGY

## 2023-03-13 PROCEDURE — 99213 OFFICE O/P EST LOW 20 MIN: CPT | Performed by: FAMILY MEDICINE

## 2023-03-13 RX ORDER — METHYLPREDNISOLONE 4 MG
TABLET, DOSE PACK ORAL
Qty: 21 TABLET | Refills: 0 | Status: SHIPPED | OUTPATIENT
Start: 2023-03-13 | End: 2023-05-12

## 2023-03-13 RX ORDER — INDOMETHACIN 50 MG/1
50 CAPSULE ORAL 2 TIMES DAILY WITH MEALS
Qty: 6 CAPSULE | Refills: 0 | Status: SHIPPED | OUTPATIENT
Start: 2023-03-13 | End: 2023-05-12

## 2023-03-13 ASSESSMENT — PAIN SCALES - GENERAL: PAINLEVEL: MODERATE PAIN (5)

## 2023-03-13 NOTE — LETTER
March 15, 2023      Sapphire Vivar  [id number] [facility] [address]        Dear ,    We are writing to inform you of your test results.    Marino Leary   X ray sis not show any fracture or dislocation   There some arthritis which could be the cause of your pain   Please continue with the plan as we discussed in the clinic.   Feel free to contact the clinic with any questions or concerns. Thank you for allowing us to participate in your care.     Miley Orr MD.       Resulted Orders   XR Finger Right G/E 2 Views    Narrative    XR FINGER RIGHT G/E 2 VIEWS 3/13/2023 2:16 PM     HISTORY: Pain of right thumb    COMPARISON: None.       Impression    IMPRESSION: 3 views of the thumb. Mild to moderate first CMC joint and  IP joint arthrosis. No evidence of acute fracture.    MIHIR GARCIA MD         SYSTEM ID:  [id number]       If you have any questions or concerns, please call the clinic at the number listed above.       Sincerely,      Miley Orr MD

## 2023-03-13 NOTE — PROGRESS NOTES
"  Assessment & Plan     Pain of right thumb  Sapphire Vivar is a 62 year old female who presents today for concern of right thumb pain and swelling   Patient had clicking in her right thumb several weeks ago then she developed pain and tenderness of thr right thumb  She denies injury or trauma   No hx if gout    I had a long discussion with the patient about her condition , diagnosis treatment options. We discussed diffenetial diagnosis, and expected course.  We discussed arthritis, trigger finger, gout , tendinitis and much more   I recommend the following :  - XR Finger Right G/E 2 Views; Future  - methylPREDNISolone (MEDROL DOSEPAK) 4 MG tablet therapy pack; Follow Package Directions  - indomethacin (INDOCIN) 50 MG capsule; Take 1 capsule (50 mg) by mouth 2 times daily (with meals) for 3 days  If not better will refer to hand specialist     Acute respiratory failure with hypoxia (H)  Resolved , now sat 93 %on RA  Occurred durign COVID 19 infection   Bilateral pulmonary embolism (H)  Provoked PE form COVID infection last year   completed 7 months of DOAC and stopped   Seen by hematology and it was recommended to stop  Systemic anticoagulation   continue to monitor     Moderate episode of recurrent major depressive disorder (H)  Stable on current meds   Continue Paxil   Colon cancer screening    - Colonoscopy Screening  Referral; Future    Encounter for screening mammogram for breast cancer    - *MA Screening Digital Bilateral; Future             BMI:   Estimated body mass index is 31.09 kg/m  as calculated from the following:    Height as of this encounter: 1.575 m (5' 2\").    Weight as of this encounter: 77.1 kg (170 lb).           No follow-ups on file.    Miley Orr MD  [facility]    [place]   Sapphire is a 62 year old, presenting for the following health issues:  Musculoskeletal Problem (/)      History of Present Illness       Reason for visit:  Thumb hurts  Symptom onset: " " More than a month  Symptoms include:  Painful  Symptom intensity:  Moderate  Symptom progression:  Worsening  What makes it worse:  Using thumb  What makes it better:  No    She eats 0-1 servings of fruits and vegetables daily.She consumes 0 sweetened beverage(s) daily.She exercises with enough effort to increase her heart rate 9 or less minutes per day.  She exercises with enough effort to increase her heart rate 3 or less days per week.   She is taking medications regularly.             Review of Systems   Constitutional, HEENT, cardiovascular, pulmonary, gi and gu systems are negative, except as otherwise noted.      Objective    /81   Pulse 79   Temp 97.6  F (36.4  C) (Tympanic)   Resp 16   Ht 1.575 m (5' 2\")   Wt 77.1 kg (170 lb)   LMP 10/15/2007   SpO2 93%   BMI 31.09 kg/m    Body mass index is 31.09 kg/m .  Physical Exam  Vitals and nursing note reviewed.   Constitutional:       General: She is not in acute distress.     Appearance: Normal appearance. She is not ill-appearing, toxic-appearing or diaphoretic.   HENT:      Head: Normocephalic and atraumatic.   Musculoskeletal:        Hands:    Neurological:      Mental Status: She is alert.                            "

## 2023-03-15 ENCOUNTER — INFUSION THERAPY VISIT (OUTPATIENT)
Dept: INFUSION THERAPY | Facility: CLINIC | Age: 63
End: 2023-03-15
Attending: FAMILY MEDICINE
Payer: COMMERCIAL

## 2023-03-15 VITALS
TEMPERATURE: 98.6 F | OXYGEN SATURATION: 95 % | RESPIRATION RATE: 18 BRPM | DIASTOLIC BLOOD PRESSURE: 75 MMHG | SYSTOLIC BLOOD PRESSURE: 114 MMHG | HEART RATE: 67 BPM

## 2023-03-15 DIAGNOSIS — F11.20 OPIOID USE DISORDER, SEVERE, DEPENDENCE (H): Primary | ICD-10-CM

## 2023-03-15 PROCEDURE — 96372 THER/PROPH/DIAG INJ SC/IM: CPT | Performed by: FAMILY MEDICINE

## 2023-03-15 PROCEDURE — 250N000011 HC RX IP 250 OP 636: Performed by: FAMILY MEDICINE

## 2023-03-15 PROCEDURE — 250N000009 HC RX 250: Performed by: FAMILY MEDICINE

## 2023-03-15 RX ORDER — METHYLPREDNISOLONE SODIUM SUCCINATE 125 MG/2ML
125 INJECTION, POWDER, LYOPHILIZED, FOR SOLUTION INTRAMUSCULAR; INTRAVENOUS
Status: CANCELLED
Start: 2023-04-12

## 2023-03-15 RX ORDER — ALBUTEROL SULFATE 90 UG/1
1-2 AEROSOL, METERED RESPIRATORY (INHALATION)
Status: CANCELLED
Start: 2023-04-12

## 2023-03-15 RX ORDER — LIDOCAINE HYDROCHLORIDE 10 MG/ML
2 INJECTION, SOLUTION EPIDURAL; INFILTRATION; INTRACAUDAL; PERINEURAL ONCE
Status: COMPLETED | OUTPATIENT
Start: 2023-03-15 | End: 2023-03-15

## 2023-03-15 RX ORDER — DIPHENHYDRAMINE HYDROCHLORIDE 50 MG/ML
50 INJECTION INTRAMUSCULAR; INTRAVENOUS
Status: CANCELLED
Start: 2023-04-12

## 2023-03-15 RX ORDER — ALBUTEROL SULFATE 0.83 MG/ML
2.5 SOLUTION RESPIRATORY (INHALATION)
Status: CANCELLED | OUTPATIENT
Start: 2023-04-12

## 2023-03-15 RX ORDER — LIDOCAINE HYDROCHLORIDE 10 MG/ML
2 INJECTION, SOLUTION EPIDURAL; INFILTRATION; INTRACAUDAL; PERINEURAL ONCE
Status: CANCELLED | OUTPATIENT
Start: 2023-04-12 | End: 2023-04-12

## 2023-03-15 RX ORDER — EPINEPHRINE 1 MG/ML
0.3 INJECTION, SOLUTION, CONCENTRATE INTRAVENOUS EVERY 5 MIN PRN
Status: CANCELLED | OUTPATIENT
Start: 2023-04-12

## 2023-03-15 RX ORDER — MEPERIDINE HYDROCHLORIDE 25 MG/ML
25 INJECTION INTRAMUSCULAR; INTRAVENOUS; SUBCUTANEOUS EVERY 30 MIN PRN
Status: CANCELLED | OUTPATIENT
Start: 2023-04-12

## 2023-03-15 RX ADMIN — BUPRENORPHINE 300 MG: 300 SOLUTION SUBCUTANEOUS at 15:30

## 2023-03-15 RX ADMIN — LIDOCAINE HYDROCHLORIDE 2 ML: 10 INJECTION, SOLUTION EPIDURAL; INFILTRATION; INTRACAUDAL; PERINEURAL at 15:27

## 2023-03-15 ASSESSMENT — PAIN SCALES - GENERAL: PAINLEVEL: NO PAIN (0)

## 2023-03-15 NOTE — PROGRESS NOTES
Infusion Nursing Note:  Sapphire Vivar presents today for first dose of sublocade 300mg.    Patient seen by provider today: No   present during visit today: Not Applicable.    Note: Patient has been on suboxone for years.  Hoping to get off this as soon as she can.  Reviewed sublocade with patient and wallet card was given.    Intravenous Access:  No Intravenous access/labs at this visit.    Treatment Conditions:  Not Applicable.    Post Infusion Assessment:  Patient tolerated injection without incident.  Lidocaine given prior to sublocade.  Sublocade injection given in the same trajectory without difficulty.  RUQ    Discharge Plan:   Patient discharged in stable condition accompanied by: son.  Departure Mode: Ambulatory  Will return to clinic in 4 weeks, appointment scheduled.      Guerda Rodriguez RN

## 2023-04-12 ENCOUNTER — INFUSION THERAPY VISIT (OUTPATIENT)
Dept: INFUSION THERAPY | Facility: CLINIC | Age: 63
End: 2023-04-12
Attending: NURSE PRACTITIONER
Payer: COMMERCIAL

## 2023-04-12 VITALS
TEMPERATURE: 97.9 F | HEART RATE: 82 BPM | SYSTOLIC BLOOD PRESSURE: 112 MMHG | OXYGEN SATURATION: 94 % | DIASTOLIC BLOOD PRESSURE: 71 MMHG

## 2023-04-12 DIAGNOSIS — F11.20 OPIOID USE DISORDER, SEVERE, DEPENDENCE (H): Primary | ICD-10-CM

## 2023-04-12 DIAGNOSIS — E78.5 DYSLIPIDEMIA: ICD-10-CM

## 2023-04-12 PROCEDURE — 250N000011 HC RX IP 250 OP 636: Performed by: FAMILY MEDICINE

## 2023-04-12 PROCEDURE — 250N000009 HC RX 250: Performed by: FAMILY MEDICINE

## 2023-04-12 PROCEDURE — 96372 THER/PROPH/DIAG INJ SC/IM: CPT | Performed by: FAMILY MEDICINE

## 2023-04-12 RX ORDER — DIPHENHYDRAMINE HYDROCHLORIDE 50 MG/ML
50 INJECTION INTRAMUSCULAR; INTRAVENOUS
Status: CANCELLED
Start: 2023-05-09

## 2023-04-12 RX ORDER — LIDOCAINE HYDROCHLORIDE 10 MG/ML
2 INJECTION, SOLUTION EPIDURAL; INFILTRATION; INTRACAUDAL; PERINEURAL ONCE
Status: COMPLETED | OUTPATIENT
Start: 2023-04-12 | End: 2023-04-12

## 2023-04-12 RX ORDER — ATORVASTATIN CALCIUM 10 MG/1
10 TABLET, FILM COATED ORAL DAILY
Qty: 30 TABLET | Refills: 0 | Status: SHIPPED | OUTPATIENT
Start: 2023-04-12 | End: 2023-05-17

## 2023-04-12 RX ORDER — METHYLPREDNISOLONE SODIUM SUCCINATE 125 MG/2ML
125 INJECTION, POWDER, LYOPHILIZED, FOR SOLUTION INTRAMUSCULAR; INTRAVENOUS
Status: CANCELLED
Start: 2023-05-09

## 2023-04-12 RX ORDER — ALBUTEROL SULFATE 90 UG/1
1-2 AEROSOL, METERED RESPIRATORY (INHALATION)
Status: CANCELLED
Start: 2023-05-09

## 2023-04-12 RX ORDER — EPINEPHRINE 1 MG/ML
0.3 INJECTION, SOLUTION, CONCENTRATE INTRAVENOUS EVERY 5 MIN PRN
Status: CANCELLED | OUTPATIENT
Start: 2023-05-09

## 2023-04-12 RX ORDER — LIDOCAINE HYDROCHLORIDE 10 MG/ML
2 INJECTION, SOLUTION EPIDURAL; INFILTRATION; INTRACAUDAL; PERINEURAL ONCE
Status: CANCELLED | OUTPATIENT
Start: 2023-05-09 | End: 2023-05-09

## 2023-04-12 RX ORDER — ALBUTEROL SULFATE 0.83 MG/ML
2.5 SOLUTION RESPIRATORY (INHALATION)
Status: CANCELLED | OUTPATIENT
Start: 2023-05-09

## 2023-04-12 RX ORDER — MEPERIDINE HYDROCHLORIDE 25 MG/ML
25 INJECTION INTRAMUSCULAR; INTRAVENOUS; SUBCUTANEOUS EVERY 30 MIN PRN
Status: CANCELLED | OUTPATIENT
Start: 2023-05-09

## 2023-04-12 RX ADMIN — LIDOCAINE HYDROCHLORIDE 2 ML: 10 INJECTION, SOLUTION EPIDURAL; INFILTRATION; INTRACAUDAL; PERINEURAL at 11:56

## 2023-04-12 RX ADMIN — BUPRENORPHINE 300 MG: 300 SOLUTION SUBCUTANEOUS at 12:01

## 2023-04-12 NOTE — PROGRESS NOTES
Infusion Nursing Note:  Sapphire Vivar presents today for sublocade injection.    Patient seen by provider today: No   present during visit today: Not Applicable.    Note: Sublocade injection given in LUQ.    Intravenous Access:  No Intravenous access/labs at this visit.    Treatment Conditions:  Not Applicable.    Post Infusion Assessment:  Patient tolerated injection poorly due to : pain with sublocade injection.     Discharge Plan:   Discharge instructions reviewed with: Patient.  Patient and/or family verbalized understanding of discharge instructions and all questions answered.  Patient discharged in stable condition accompanied by: self.  Departure Mode: Ambulatory.      Jocy Driscoll RN

## 2023-04-17 DIAGNOSIS — E03.9 ACQUIRED HYPOTHYROIDISM: ICD-10-CM

## 2023-04-17 RX ORDER — LEVOTHYROXINE SODIUM 50 UG/1
50 TABLET ORAL DAILY
Qty: 30 TABLET | Refills: 0 | Status: SHIPPED | OUTPATIENT
Start: 2023-04-17 | End: 2023-05-30

## 2023-04-28 DIAGNOSIS — F41.1 ANXIETY STATE: ICD-10-CM

## 2023-04-28 DIAGNOSIS — F33.1 MODERATE EPISODE OF RECURRENT MAJOR DEPRESSIVE DISORDER (H): ICD-10-CM

## 2023-04-28 RX ORDER — PAROXETINE 20 MG/1
20 TABLET, FILM COATED ORAL DAILY
Qty: 90 TABLET | Refills: 0 | Status: SHIPPED | OUTPATIENT
Start: 2023-04-28 | End: 2023-07-28

## 2023-05-11 ENCOUNTER — TELEPHONE (OUTPATIENT)
Dept: BEHAVIORAL HEALTH | Facility: CLINIC | Age: 63
End: 2023-05-11
Payer: COMMERCIAL

## 2023-05-11 NOTE — TELEPHONE ENCOUNTER
----- Message from CRISTIAN Chavira CNP sent at 5/11/2023  3:02 PM CDT -----  Regarding: RE: Sublocade  Hello,     Thank you for reaching out. Please instruct the patient to follow up in the Recovery Clinic prior to or after her injection tomorrow for a provider visit. She also has lab work that needed to be completed.     Thank you,     CRISTIAN Chavira CNP on 5/11/2023 at 3:03 PM    ----- Message -----  From: Deborah Reilly RN  Sent: 5/11/2023  12:57 PM CDT  To: CRISTIAN Chavira CNP  Subject: Sublocade                                        Hello!     Patient is scheduled for sublocade injection tomorrow. According to your note, they were supposed to follow up with you after their initial injection, which they haven't done.     Wondering if you need to see them before the injection, or if it is okay to give?     Thanks,  Deborah WOODS RN,  [facility]      Writer contacted patient with the providers recommendation for a follow-up visit in the Recovery Clinic either before or after her appointment in the Infusion center on 05/12/2023 @1:30 pm. Patient conveyed understanding and agreed to do so.     Yvonne Ball RN on 5/11/2023 at 3:16 PM

## 2023-05-12 ENCOUNTER — INFUSION THERAPY VISIT (OUTPATIENT)
Dept: INFUSION THERAPY | Facility: CLINIC | Age: 63
End: 2023-05-12
Attending: FAMILY MEDICINE
Payer: COMMERCIAL

## 2023-05-12 ENCOUNTER — OFFICE VISIT (OUTPATIENT)
Dept: BEHAVIORAL HEALTH | Facility: CLINIC | Age: 63
End: 2023-05-12
Payer: COMMERCIAL

## 2023-05-12 VITALS
TEMPERATURE: 98.3 F | DIASTOLIC BLOOD PRESSURE: 65 MMHG | SYSTOLIC BLOOD PRESSURE: 101 MMHG | HEART RATE: 77 BPM | OXYGEN SATURATION: 96 %

## 2023-05-12 VITALS
SYSTOLIC BLOOD PRESSURE: 105 MMHG | DIASTOLIC BLOOD PRESSURE: 69 MMHG | TEMPERATURE: 98.2 F | HEART RATE: 77 BPM | OXYGEN SATURATION: 94 %

## 2023-05-12 DIAGNOSIS — F11.20 OPIOID USE DISORDER, SEVERE, DEPENDENCE (H): ICD-10-CM

## 2023-05-12 DIAGNOSIS — F11.20 OPIOID USE DISORDER, SEVERE, DEPENDENCE (H): Primary | ICD-10-CM

## 2023-05-12 DIAGNOSIS — F17.200 TOBACCO USE DISORDER: ICD-10-CM

## 2023-05-12 LAB
AMPHETAMINE QUAL URINE POCT: NEGATIVE
BARBITURATE QUAL URINE POCT: NEGATIVE
BENZODIAZEPINE QUAL URINE POCT: NEGATIVE
BUPRENORPHINE QUAL URINE POCT: ABNORMAL
COCAINE QUAL URINE POCT: NEGATIVE
CREATININE QUAL URINE POCT: ABNORMAL
INTERNAL QC QUAL URINE POCT: ABNORMAL
MDMA QUAL URINE POCT: NEGATIVE
METHADONE QUAL URINE POCT: NEGATIVE
METHAMPHETAMINE QUAL URINE POCT: NEGATIVE
OPIATE QUAL URINE POCT: NEGATIVE
OXYCODONE QUAL URINE POCT: NEGATIVE
PH QUAL URINE POCT: ABNORMAL
PHENCYCLIDINE QUAL URINE POCT: NEGATIVE
POCT KIT EXPIRATION DATE: ABNORMAL
POCT KIT LOT NUMBER: ABNORMAL
SPECIFIC GRAVITY POCT: 1.02
TEMPERATURE URINE POCT: ABNORMAL
THC QUAL URINE POCT: NEGATIVE

## 2023-05-12 PROCEDURE — 250N000009 HC RX 250: Performed by: FAMILY MEDICINE

## 2023-05-12 PROCEDURE — 250N000011 HC RX IP 250 OP 636: Performed by: FAMILY MEDICINE

## 2023-05-12 PROCEDURE — 80305 DRUG TEST PRSMV DIR OPT OBS: CPT | Performed by: FAMILY MEDICINE

## 2023-05-12 PROCEDURE — 96372 THER/PROPH/DIAG INJ SC/IM: CPT | Performed by: FAMILY MEDICINE

## 2023-05-12 PROCEDURE — 99214 OFFICE O/P EST MOD 30 MIN: CPT | Performed by: FAMILY MEDICINE

## 2023-05-12 RX ORDER — LIDOCAINE HYDROCHLORIDE 10 MG/ML
2 INJECTION, SOLUTION EPIDURAL; INFILTRATION; INTRACAUDAL; PERINEURAL ONCE
Status: CANCELLED | OUTPATIENT
Start: 2023-06-07 | End: 2023-06-07

## 2023-05-12 RX ORDER — METHYLPREDNISOLONE SODIUM SUCCINATE 125 MG/2ML
125 INJECTION, POWDER, LYOPHILIZED, FOR SOLUTION INTRAMUSCULAR; INTRAVENOUS
Status: CANCELLED
Start: 2023-06-07

## 2023-05-12 RX ORDER — MEPERIDINE HYDROCHLORIDE 25 MG/ML
25 INJECTION INTRAMUSCULAR; INTRAVENOUS; SUBCUTANEOUS EVERY 30 MIN PRN
Status: CANCELLED | OUTPATIENT
Start: 2023-06-07

## 2023-05-12 RX ORDER — BUPRENORPHINE AND NALOXONE 8; 2 MG/1; MG/1
.5-1 FILM, SOLUBLE BUCCAL; SUBLINGUAL DAILY PRN
Qty: 30 FILM | Refills: 0 | Status: SHIPPED | OUTPATIENT
Start: 2023-05-12 | End: 2023-06-20

## 2023-05-12 RX ORDER — ALBUTEROL SULFATE 0.83 MG/ML
2.5 SOLUTION RESPIRATORY (INHALATION)
Status: CANCELLED | OUTPATIENT
Start: 2023-06-07

## 2023-05-12 RX ORDER — DIPHENHYDRAMINE HYDROCHLORIDE 50 MG/ML
50 INJECTION INTRAMUSCULAR; INTRAVENOUS
Status: CANCELLED
Start: 2023-06-07

## 2023-05-12 RX ORDER — EPINEPHRINE 1 MG/ML
0.3 INJECTION, SOLUTION, CONCENTRATE INTRAVENOUS EVERY 5 MIN PRN
Status: CANCELLED | OUTPATIENT
Start: 2023-06-07

## 2023-05-12 RX ORDER — LIDOCAINE HYDROCHLORIDE 10 MG/ML
2 INJECTION, SOLUTION EPIDURAL; INFILTRATION; INTRACAUDAL; PERINEURAL ONCE
Status: COMPLETED | OUTPATIENT
Start: 2023-05-12 | End: 2023-05-12

## 2023-05-12 RX ORDER — ALBUTEROL SULFATE 90 UG/1
1-2 AEROSOL, METERED RESPIRATORY (INHALATION)
Status: CANCELLED
Start: 2023-06-07

## 2023-05-12 RX ADMIN — LIDOCAINE HYDROCHLORIDE 2 ML: 10 INJECTION, SOLUTION EPIDURAL; INFILTRATION; INTRACAUDAL; PERINEURAL at 13:54

## 2023-05-12 RX ADMIN — BUPRENORPHINE 300 MG: 300 SOLUTION SUBCUTANEOUS at 13:58

## 2023-05-12 ASSESSMENT — PATIENT HEALTH QUESTIONNAIRE - PHQ9: SUM OF ALL RESPONSES TO PHQ QUESTIONS 1-9: 6

## 2023-05-12 NOTE — PROGRESS NOTES
"[facility] Follow Up    ASSESSMENT/PLAN                                                      1. Opioid use disorder, severe, dependence (H)  Overall tolerating Sublocade, continues with 8-12mg/day SL buprenorphine requirement.    Sublocade #3 today, will maintain 300mg dose  Decrease supplemental buprenorphine to max 8mg/day prn  Alt/ast next visit; pt did not have time to stay for this today  - Drugs of Abuse Screen Urine (POC CUPS) POCT; Standing  - buprenorphine HCl-naloxone HCl (SUBOXONE) 8-2 MG per film; Place 0.5-1 Film under the tongue daily as needed (withdrawal symptoms or cravings)  Dispense: 30 Film; Refill: 0    2. Tobacco use disorder  Has NRT.  Expressing interest in quitting.        Return in about 4 weeks (around 6/9/2023) for Follow up, in person.    SUBJECTIVE                                                        CC/HPI:  Sapphire Vivar is a 62 year old female with PMH PE 2022, hypothyroidism, anxiety, depression, tobacco use disorder, and opioid use disorder on buprenorphine who presents to the Recovery Clinic for follow up.      Brief History:  Sapphire Vivar was first seen in Recovery Clinic on 01/11/23. They were referred by a friend.   Patient's reasons for seeking treatment on this date include wanting to transfer from SL buprenorphine to XR buprenorphine.   Started Sublocade 300mg on 3/15/23.  Most recent injection (#2, 300mg) was given 4/12/23.      Substance Use History:  Opioids:   Age at first use: late 40s  Current use: substance: heroin; quantity \"a lot\"; route: insufflation ; timing of last use: 2018;  First started buprenorphine through [facility] 2018, then began purchasing from nonprescription sources after missing an appointment due to problems arising while visiting her sister that prolonged her visit.  Has taken 24mg buprenorphine/day since 2019.                 IV drug use: No   History of overdose: No  Previous residential or outpatient treatments " for addiction : Yes: 3 times  Previous medication treatments for addiction: Yes: Suboxone  Longest period of sobriety: currently  Medical complications related to substance use: denies  Hepatitis C: negative per pt; Date of most recent testing: no record of testing  HIV: negative per pt; Date of most recent testing: no record of testing     Other Addiction History:  Stimulants   Cocaine in 20s  Sedatives/hypnotics/anxiolytics:   denies  Alcohol:   denies  Nicotine:   1 pack daily  Cannabis:   denies  Hallucinogens/Dissociatives:   denies  Eating disorder:  denies  Gambling:              denies    PAST PSYCHIATRIC HISTORY:  Diagnoses- anxiety/depression  Suicide Attempts: No   Hospitalizations: No     Social History  Housing status: with son,  Whom she takes care of he has down syndrome   Employment status: Employed full time  Relationship status: Single  Children: 4 children all adults  Legal: denies  Insurance needs: active  Contact information up to date? yes       Most recent Recovery Clinic visit 3/1/223  A/P last visit:  1. Opioid use disorder, severe, dependence (H)  - Continue Suboxone 8 mg TID until initial Sublocade injection. Discontinue Suboxone 24 hrous after initial injection. Reviewed pt may require supplemental buprenorphine after initial injection, OK to take 4-8 mg after initial injection as needed for cravings or withdrawal symptoms. Pt to follow up in clinic after initial injection.   - AST and ALT lab orders placed on 1/11/23 per Dr. Zhang.   - confirms access to narcan   - buprenorphine HCl-naloxone HCl (SUBOXONE) 8-2 MG per film; Place 1 Film under the tongue 3 times daily  Dispense: 90 Film; Refill: 0     2. Tobacco use disorder  - contemplation stage of change, reviewed use of NRT as below.   - nicotine polacrilex (NICORETTE) 4 MG gum; Place 1 each (4 mg) inside cheek every hour as needed for smoking cessation  Dispense: 240 each; Refill: 1              Return in about 4 weeks (around  3/29/2023) for Follow up, with any available provider, in person.       5/12/23 visit:  Pt here for first follow-up since starting Sublocade 3/15/23.  States initial injection was without problems.  Second injection was more painful and she experienced redness and swelling at injection site for about one week.  Has required 8-16mg SL buprenorphine to avoid withdrawal symptoms or cravings.  No other side effect complaints.        [place] Prescription Drug Monitoring Program Reviewed:  Yes  03/01/2023  1   03/01/2023  Suboxone 8 Mg-2 MG SL Film  90.00  30 Cl Max   [id number]   Vamshi ([id number])   0/0  24.00 mg  [place]icaid [place]   01/11/2023  1   01/11/2023  Suboxone 8 Mg-2 MG SL Film  90.00  30 Li Vol   [id number]   Vamshi ([id number])   0/0  24.00 mg  [place]icaid [place]         Medications:  atorvastatin (LIPITOR) 10 MG tablet, Take 1 tablet (10 mg) by mouth daily  levothyroxine (SYNTHROID/LEVOTHROID) 50 MCG tablet, Take 1 tablet (50 mcg) by mouth daily  PARoxetine (PAXIL) 20 MG tablet, Take 1 tablet (20 mg) by mouth daily  traZODone (DESYREL) 150 MG tablet, TAKE ONE TABLET BY MOUTH AT BEDTIME  VITAMIN D3 50 MCG (2000 UT) tablet, TAKE ONE TABLET BY MOUTH ONE TIME DAILY  naloxone (NARCAN) 4 MG/0.1ML nasal spray, Spray 1 spray (4 mg) into one nostril alternating nostrils once as needed for opioid reversal every 2-3 minutes until assistance arrives (Patient not taking: Reported on 3/1/2023)  nicotine polacrilex (NICORETTE) 4 MG gum, Place 1 each (4 mg) inside cheek every hour as needed for smoking cessation (Patient not taking: Reported on 5/12/2023)    [COMPLETED] buprenorphine ER (SUBLOCADE) syringe 300 mg  [COMPLETED] lidocaine (PF) (XYLOCAINE) 1 % injection 2 mL        Allergies   Allergen Reactions     Pcn [Penicillins] Rash       PMH, PSH, FamHx reviewed      OBJECTIVE                                                      /65 (BP Location: Right arm, Patient Position: Sitting)   Pulse 77   Temp 98.3  F (36.8  C) (Oral)   LMP  10/15/2007   SpO2 96%       1/11/2023    11:00 AM 3/1/2023     1:00 PM 5/12/2023     1:00 PM   PHQ   PHQ-9 Total Score 6 7 6   Q9: Thoughts of better off dead/self-harm past 2 weeks Not at all Not at all Not at all       Physical Exam  Constitutional:       General: She is not in acute distress.     Appearance: Normal appearance.   Pulmonary:      Effort: Pulmonary effort is normal.   Neurological:      Mental Status: She is alert and oriented to person, place, and time.   Psychiatric:         Attention and Perception: Attention and perception normal.         Mood and Affect: Mood and affect normal.         Speech: Speech normal.         Behavior: Behavior is cooperative.         Thought Content: Thought content normal.      Comments: Insight adequate judgment fair          Labs:    UDS:    Urine Drug Screen Results  AMP: Negative  BAR: Negative  BUP: Positive  BZO: Negative  THEODORA: Negative  mAMP: Negative  MDMA : Negative  MTD: Negative  EMA431: Negative  OXY: Negative  PCP : Negative  THC : Negative  *POC urine drug screen does not screen for Fentanyl      Recent Results (from the past 240 hour(s))   Drugs of Abuse Screen Urine (POC CUPS) POCT    Collection Time: 05/12/23  1:00 PM   Result Value Ref Range    POCT Kit Lot Number [id number]     POCT Kit Expiration Date 06-     Temperature Urine POCT 92 F 90 F, 92 F, 94 F, 96 F, 98 F, 100 F    Specific Menan POCT 1.025 1.005, 1.015, 1.025    pH Qual Urine POCT 5 pH 4 pH, 5 pH, 7 pH, 9 pH    Creatinine Qual Urine POCT 50 mg/dL 20 mg/dL, 50 mg/dL, 100 mg/dL, 200 mg/dL    Internal QC Qual Urine POCT Valid Valid    Amphetamine Qual Urine POCT Negative Negative    Barbiturate Qual Urine POCT Negative Negative    Buprenorphine Qual Urine POCT Screen Positive (A) Negative    Benzodiazepine Qual Urine POCT Negative Negative    Cocaine Qual Urine POCT Negative Negative    Methamphetamine Qual Urine POCT Negative Negative    MDMA Qual Urine POCT Negative Negative     Methadone Qual Urine POCT Negative Negative    Opiate Qual Urine POCT Negative Negative    Oxycodone Qual Urine POCT Negative Negative    Phencyclidine Qual Urine POCT Negative Negative    THC Qual Urine POCT Negative Negative         Patient counseling completed today:  Discussed mechanism of action, potential risks/benefits/side effects of medications and other recommendations above.  Recommend pt keep naloxone in their possession and reviewed other aspects of harm reduction to reduce risk of overdose with return to use.   Recommended avoiding concurrent use of alcohol, benzodiazepines or other sedatives with buprenorphine or other opioids.      Gema Zhang MD  Addiction Medicine  [place] [id number] [address] [id number] [phone]

## 2023-05-12 NOTE — NURSING NOTE
[facility]      Rooming information:  Approximate last use of full opioid agonist: Years ago  Taking buprenorphine? Yes:  As prescribed? Yes: On Sublocade 300 mg and using Suboxone 8-2 mg films PRN  Number of buprenorphine films/tablets remaining currently: 0  Side effects related to buprenorphine (constipation, dry mouth, sedation?) Yes: Severe constipation  Narcan currently available: No  Other recent substance use:    Denies  NICOTINE-Yes: Cigarettes  If using nicotine, ready to quit? Yes:     Point of care urine drug screen positive for:  No results found for: BUP, BZO, BAR, THEODORA, MAMP, AMP, MDMA, MTD, YBR519, OXY, PCP, THC, TEMP, SGPOCT    *POC urine drug screen does not screen for Fentanyl    Pregnancy Status   LMP: Hysterectomy        5/12/2023     1:00 PM   PHQ Assesment Total Score(s)   PHQ-9 Score 6       If PHQ-9 score of 15 or higher, has Recovery Clinic therapist or provider been notified? N/A    Any current suicidal ideation? No  If yes, has [facility] therapist or provider been notified? No    Primary care provider: [facility] [place]    Mental health provider: Denies (follow up on MH referral if needed)    Insurance needs: Active    Housing needs: Stable [address]    Contact information up to date? Yes    3rd Party Involvement None today (please obtain SASHA if pt would like to include)    Yvonne Ball RN  May 12, 2023  12:58 PM

## 2023-05-12 NOTE — PROGRESS NOTES
Infusion Nursing Note:  Sapphire Vivar presents today for sublocade 300mg.    Patient seen by provider today: No   present during visit today: Not Applicable.    Note: Pt reported having pain for several days after previous injection. Writer made sure to helen injection site so that sublocade was given in the same site as lidocaine. Pt again continued about pain after injection but said it wasn't as bad as last time. Injection given in RUQ.       Intravenous Access:  Peripheral IV placed.    Treatment Conditions:  Not Applicable.      Post Infusion Assessment:  Patient tolerated injection without incident.  Site patent and intact, free from redness, edema or discomfort.       Discharge Plan:   Discharge instructions reviewed with: Patient.  Patient and/or family verbalized understanding of discharge instructions and all questions answered.  Patient discharged in stable condition accompanied by: self.  Departure Mode: Ambulatory.      Jocy Driscoll RN

## 2023-05-17 DIAGNOSIS — E78.5 DYSLIPIDEMIA: ICD-10-CM

## 2023-05-17 RX ORDER — ATORVASTATIN CALCIUM 10 MG/1
10 TABLET, FILM COATED ORAL DAILY
Qty: 30 TABLET | Refills: 0 | Status: SHIPPED | OUTPATIENT
Start: 2023-05-17 | End: 2023-06-19

## 2023-05-29 DIAGNOSIS — E03.9 ACQUIRED HYPOTHYROIDISM: ICD-10-CM

## 2023-05-30 RX ORDER — LEVOTHYROXINE SODIUM 50 UG/1
50 TABLET ORAL DAILY
Qty: 30 TABLET | Refills: 0 | Status: SHIPPED | OUTPATIENT
Start: 2023-05-30 | End: 2023-07-05

## 2023-06-01 DIAGNOSIS — F51.04 PSYCHOPHYSIOLOGICAL INSOMNIA: ICD-10-CM

## 2023-06-01 NOTE — TELEPHONE ENCOUNTER
Provider:  Please refill trazodone if appropriate.  Thank you. Arleen Abraham R.N.    Patient is requesting a refill of her trazodone. Refill will be sent to the provider and patient requested to make an appointment. .The patient was warm transferred to central scheduling and they were asked to assist patient in making an appointment at the patient's preferred location. They verified they understood. Thank you. Arleen Abraham R.N.

## 2023-06-02 RX ORDER — TRAZODONE HYDROCHLORIDE 150 MG/1
TABLET ORAL
Qty: 90 TABLET | Refills: 2 | Status: SHIPPED | OUTPATIENT
Start: 2023-06-02 | End: 2024-01-30

## 2023-06-09 ENCOUNTER — TELEPHONE (OUTPATIENT)
Dept: GASTROENTEROLOGY | Facility: CLINIC | Age: 63
End: 2023-06-09
Payer: COMMERCIAL

## 2023-06-09 ENCOUNTER — HOSPITAL ENCOUNTER (OUTPATIENT)
Facility: AMBULATORY SURGERY CENTER | Age: 63
End: 2023-06-09
Attending: INTERNAL MEDICINE
Payer: COMMERCIAL

## 2023-06-09 NOTE — TELEPHONE ENCOUNTER
"Endoscopy Scheduling Screen    Have you had a positive Covid test in the last 14 days?  No    Are you active on MyChart?   No    What insurance is in the chart?  Other:  [facility]    Ordering/Referring Provider: Miley Orr   (If ordering provider performs procedure, schedule with ordering provider unless otherwise instructed. )    BMI: Estimated body mass index is 31.09 kg/m  as calculated from the following:    Height as of 3/13/23: 1.575 m (5' 2\").    Weight as of 3/13/23: 77.1 kg (170 lb).     Sedation Ordered  moderate sedation.   If patient BMI > 50 do not schedule in ASC.    Are you taking any prescription medications for pain?   No    Are you taking methadone or Suboxone?  Yes   Patient must be scheduled with MAC sedation.    Please send In Basket alert to Ije with [facility].      Do you have a history of malignant hyperthermia or adverse reaction to anesthesia?  No    (Females) Are you currently pregnant?   No     Have you been diagnosed or told you have pulmonary hypertension?   No    Do you have an LVAD?  No    Have you been told you have moderate to severe sleep apnea?  No    Have you been told you have COPD, asthma, or any other lung disease?  No    Do you have any heart conditions?  No     Have you ever had or are you awaiting a heart or lung transplant?   No    Have you had a stroke or transient ischemic attack (TIA aka \"mini stroke\" in the last 6 months?   No    Have you been diagnosed with or been told you have cirrhosis of the liver?   No    Are you currently on dialysis?   No    Do you need assistance transferring?   No    BMI: Estimated body mass index is 31.09 kg/m  as calculated from the following:    Height as of 3/13/23: 1.575 m (5' 2\").    Weight as of 3/13/23: 77.1 kg (170 lb).     Is patients BMI > 40 and scheduling location [facility]?  No    Do you take the medication Phentermine, Ozempic or Wegovy?  No    Do you take the medication Naltrexone?  No    Do you take blood thinners?  No    Preferred " Pharmacy:    [id number] - [place], [place] - [address]   [place] [address]  Phone: [phone] Fax: [phone]      Prep   Are you currently on dialysis or do you have chronic kidney disease?  No (standard prep)    Do you have a diagnosis of diabetes?  No    Do you have a diagnosis of cystic fibrosis (CF)?  No    On a regular basis do you go 3 -5 days between bowel movements?  Yes (Extended Prep)    BMI > 40?  No    Final Scheduling Details   Colonoscopy prep sent?  Gol[facility] Extended Prep    Procedure scheduled  Colonoscopy     Surgeon:  annie       Date of procedure:  9/13/23     Schedule PAC:   No    Location  [facility]    Sedation   MAC/Deep Sedation    Patient Reminders:    You will receive a call from a Nurse to review instructions and health history.  This assessment must be completed prior to your procedure.  Failure to complete the Nurse assessment may result in the procedure being cancelled.       On the day of your procedure, please designate an adult(s) who can drive you home stay with you for the next 24 hours. The medicines used in the exam will make you sleepy. You will not be able to drive.       You cannot take public transportation, ride share services, or non-medical taxi service without a responsible caregiver.  Medical transport services are allowed with the requirement that a responsible caregiver will receive you at your destination.  We require that drivers and caregivers are confirmed prior to your procedure.

## 2023-06-19 DIAGNOSIS — E78.5 DYSLIPIDEMIA: ICD-10-CM

## 2023-06-19 RX ORDER — ATORVASTATIN CALCIUM 10 MG/1
10 TABLET, FILM COATED ORAL DAILY
Qty: 30 TABLET | Refills: 0 | Status: SHIPPED | OUTPATIENT
Start: 2023-06-19 | End: 2023-08-04

## 2023-06-20 ENCOUNTER — OFFICE VISIT (OUTPATIENT)
Dept: BEHAVIORAL HEALTH | Facility: CLINIC | Age: 63
End: 2023-06-20
Payer: COMMERCIAL

## 2023-06-20 ENCOUNTER — INFUSION THERAPY VISIT (OUTPATIENT)
Dept: INFUSION THERAPY | Facility: CLINIC | Age: 63
End: 2023-06-20
Attending: FAMILY MEDICINE
Payer: COMMERCIAL

## 2023-06-20 VITALS
WEIGHT: 165 LBS | SYSTOLIC BLOOD PRESSURE: 101 MMHG | DIASTOLIC BLOOD PRESSURE: 69 MMHG | HEART RATE: 76 BPM | BODY MASS INDEX: 30.18 KG/M2

## 2023-06-20 VITALS — SYSTOLIC BLOOD PRESSURE: 111 MMHG | DIASTOLIC BLOOD PRESSURE: 71 MMHG

## 2023-06-20 DIAGNOSIS — Z79.899 ENCOUNTER FOR LONG-TERM CURRENT USE OF HIGH RISK MEDICATION: ICD-10-CM

## 2023-06-20 DIAGNOSIS — F11.20 OPIOID USE DISORDER, SEVERE, DEPENDENCE (H): Primary | ICD-10-CM

## 2023-06-20 DIAGNOSIS — F17.200 TOBACCO USE DISORDER: ICD-10-CM

## 2023-06-20 LAB
AMPHETAMINE QUAL URINE POCT: NEGATIVE
BARBITURATE QUAL URINE POCT: NEGATIVE
BENZODIAZEPINE QUAL URINE POCT: NEGATIVE
BUPRENORPHINE QUAL URINE POCT: ABNORMAL
COCAINE QUAL URINE POCT: NEGATIVE
CREATININE QUAL URINE POCT: ABNORMAL
FENTANYL UR QL: NORMAL
INTERNAL QC QUAL URINE POCT: ABNORMAL
MDMA QUAL URINE POCT: NEGATIVE
METHADONE QUAL URINE POCT: NEGATIVE
METHAMPHETAMINE QUAL URINE POCT: NEGATIVE
OPIATE QUAL URINE POCT: NEGATIVE
OXYCODONE QUAL URINE POCT: NEGATIVE
PH QUAL URINE POCT: ABNORMAL
PHENCYCLIDINE QUAL URINE POCT: NEGATIVE
POCT KIT EXPIRATION DATE: ABNORMAL
POCT KIT LOT NUMBER: ABNORMAL
SPECIFIC GRAVITY POCT: 1.02
TEMPERATURE URINE POCT: ABNORMAL
THC QUAL URINE POCT: NEGATIVE

## 2023-06-20 PROCEDURE — 80307 DRUG TEST PRSMV CHEM ANLYZR: CPT | Performed by: FAMILY MEDICINE

## 2023-06-20 PROCEDURE — 250N000009 HC RX 250: Performed by: FAMILY MEDICINE

## 2023-06-20 PROCEDURE — 96372 THER/PROPH/DIAG INJ SC/IM: CPT | Performed by: FAMILY MEDICINE

## 2023-06-20 PROCEDURE — 99214 OFFICE O/P EST MOD 30 MIN: CPT | Performed by: FAMILY MEDICINE

## 2023-06-20 PROCEDURE — 250N000011 HC RX IP 250 OP 636: Performed by: FAMILY MEDICINE

## 2023-06-20 RX ORDER — LIDOCAINE HYDROCHLORIDE 10 MG/ML
2 INJECTION, SOLUTION EPIDURAL; INFILTRATION; INTRACAUDAL; PERINEURAL ONCE
Status: CANCELLED | OUTPATIENT
Start: 2023-07-07 | End: 2023-07-07

## 2023-06-20 RX ORDER — BUPRENORPHINE AND NALOXONE 8; 2 MG/1; MG/1
0.5 FILM, SOLUBLE BUCCAL; SUBLINGUAL DAILY PRN
Qty: 15 FILM | Refills: 0 | Status: SHIPPED | OUTPATIENT
Start: 2023-06-20 | End: 2024-02-13

## 2023-06-20 RX ORDER — ALBUTEROL SULFATE 0.83 MG/ML
2.5 SOLUTION RESPIRATORY (INHALATION)
Status: CANCELLED | OUTPATIENT
Start: 2023-07-07

## 2023-06-20 RX ORDER — DIPHENHYDRAMINE HYDROCHLORIDE 50 MG/ML
50 INJECTION INTRAMUSCULAR; INTRAVENOUS
Status: CANCELLED
Start: 2023-07-07

## 2023-06-20 RX ORDER — MEPERIDINE HYDROCHLORIDE 25 MG/ML
25 INJECTION INTRAMUSCULAR; INTRAVENOUS; SUBCUTANEOUS EVERY 30 MIN PRN
Status: CANCELLED | OUTPATIENT
Start: 2023-07-07

## 2023-06-20 RX ORDER — METHYLPREDNISOLONE SODIUM SUCCINATE 125 MG/2ML
125 INJECTION, POWDER, LYOPHILIZED, FOR SOLUTION INTRAMUSCULAR; INTRAVENOUS
Status: CANCELLED
Start: 2023-07-07

## 2023-06-20 RX ORDER — EPINEPHRINE 1 MG/ML
0.3 INJECTION, SOLUTION, CONCENTRATE INTRAVENOUS EVERY 5 MIN PRN
Status: CANCELLED | OUTPATIENT
Start: 2023-07-07

## 2023-06-20 RX ORDER — ALBUTEROL SULFATE 90 UG/1
1-2 AEROSOL, METERED RESPIRATORY (INHALATION)
Status: CANCELLED
Start: 2023-07-07

## 2023-06-20 RX ORDER — LIDOCAINE HYDROCHLORIDE 10 MG/ML
2 INJECTION, SOLUTION EPIDURAL; INFILTRATION; INTRACAUDAL; PERINEURAL ONCE
Status: COMPLETED | OUTPATIENT
Start: 2023-06-20 | End: 2023-06-20

## 2023-06-20 RX ADMIN — LIDOCAINE HYDROCHLORIDE 2 ML: 10 INJECTION, SOLUTION EPIDURAL; INFILTRATION; INTRACAUDAL; PERINEURAL at 12:49

## 2023-06-20 RX ADMIN — BUPRENORPHINE 300 MG: 300 SOLUTION SUBCUTANEOUS at 12:51

## 2023-06-20 ASSESSMENT — PATIENT HEALTH QUESTIONNAIRE - PHQ9: SUM OF ALL RESPONSES TO PHQ QUESTIONS 1-9: 3

## 2023-06-20 NOTE — PROGRESS NOTES
Infusion Nursing Note:  Sapphire Vivar presents today for Sublocade 300 mg.    Patient seen by provider today: No   present during visit today: Not Applicable.    Note: Lidocaine then Sublocade injected into abd.      Intravenous Access:  No Intravenous access/labs at this visit.    Treatment Conditions:  Denies opioid use.      Post Infusion Assessment:  Patient tolerated injection without incident.       Discharge Plan:   Patient and/or family verbalized understanding of discharge instructions and all questions answered.  Patient discharged in stable condition accompanied by: son.      AUGUSTINE SNIDER RN
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
## 2023-07-03 DIAGNOSIS — E03.9 ACQUIRED HYPOTHYROIDISM: ICD-10-CM

## 2023-07-05 RX ORDER — LEVOTHYROXINE SODIUM 50 UG/1
50 TABLET ORAL DAILY
Qty: 30 TABLET | Refills: 0 | Status: SHIPPED | OUTPATIENT
Start: 2023-07-05 | End: 2023-08-04

## 2023-07-25 ENCOUNTER — INFUSION THERAPY VISIT (OUTPATIENT)
Dept: INFUSION THERAPY | Facility: CLINIC | Age: 63
End: 2023-07-25
Attending: FAMILY MEDICINE
Payer: COMMERCIAL

## 2023-07-25 VITALS — HEART RATE: 63 BPM | SYSTOLIC BLOOD PRESSURE: 109 MMHG | TEMPERATURE: 99.1 F | DIASTOLIC BLOOD PRESSURE: 70 MMHG

## 2023-07-25 DIAGNOSIS — F11.20 OPIOID USE DISORDER, SEVERE, DEPENDENCE (H): Primary | ICD-10-CM

## 2023-07-25 PROCEDURE — 96372 THER/PROPH/DIAG INJ SC/IM: CPT | Performed by: FAMILY MEDICINE

## 2023-07-25 PROCEDURE — 250N000011 HC RX IP 250 OP 636: Mod: JZ | Performed by: FAMILY MEDICINE

## 2023-07-25 PROCEDURE — 250N000009 HC RX 250: Performed by: FAMILY MEDICINE

## 2023-07-25 RX ORDER — LIDOCAINE HYDROCHLORIDE 10 MG/ML
2 INJECTION, SOLUTION EPIDURAL; INFILTRATION; INTRACAUDAL; PERINEURAL ONCE
Status: CANCELLED | OUTPATIENT
Start: 2023-08-15 | End: 2023-08-15

## 2023-07-25 RX ORDER — ALBUTEROL SULFATE 90 UG/1
1-2 AEROSOL, METERED RESPIRATORY (INHALATION)
Status: CANCELLED
Start: 2023-08-15

## 2023-07-25 RX ORDER — METHYLPREDNISOLONE SODIUM SUCCINATE 125 MG/2ML
125 INJECTION, POWDER, LYOPHILIZED, FOR SOLUTION INTRAMUSCULAR; INTRAVENOUS
Status: CANCELLED
Start: 2023-08-15

## 2023-07-25 RX ORDER — LIDOCAINE HYDROCHLORIDE 10 MG/ML
2 INJECTION, SOLUTION EPIDURAL; INFILTRATION; INTRACAUDAL; PERINEURAL ONCE
Status: COMPLETED | OUTPATIENT
Start: 2023-07-25 | End: 2023-07-25

## 2023-07-25 RX ORDER — DIPHENHYDRAMINE HYDROCHLORIDE 50 MG/ML
50 INJECTION INTRAMUSCULAR; INTRAVENOUS
Status: CANCELLED
Start: 2023-08-15

## 2023-07-25 RX ORDER — EPINEPHRINE 1 MG/ML
0.3 INJECTION, SOLUTION, CONCENTRATE INTRAVENOUS EVERY 5 MIN PRN
Status: CANCELLED | OUTPATIENT
Start: 2023-08-15

## 2023-07-25 RX ORDER — ALBUTEROL SULFATE 0.83 MG/ML
2.5 SOLUTION RESPIRATORY (INHALATION)
Status: CANCELLED | OUTPATIENT
Start: 2023-08-15

## 2023-07-25 RX ORDER — MEPERIDINE HYDROCHLORIDE 25 MG/ML
25 INJECTION INTRAMUSCULAR; INTRAVENOUS; SUBCUTANEOUS EVERY 30 MIN PRN
Status: CANCELLED | OUTPATIENT
Start: 2023-08-15

## 2023-07-25 RX ADMIN — BUPRENORPHINE 300 MG: 300 SOLUTION SUBCUTANEOUS at 12:10

## 2023-07-25 RX ADMIN — LIDOCAINE HYDROCHLORIDE 2 ML: 10 INJECTION, SOLUTION EPIDURAL; INFILTRATION; INTRACAUDAL; PERINEURAL at 12:03

## 2023-07-25 ASSESSMENT — PAIN SCALES - GENERAL: PAINLEVEL: NO PAIN (0)

## 2023-07-28 DIAGNOSIS — F41.1 ANXIETY STATE: ICD-10-CM

## 2023-07-28 DIAGNOSIS — F33.1 MODERATE EPISODE OF RECURRENT MAJOR DEPRESSIVE DISORDER (H): ICD-10-CM

## 2023-07-28 RX ORDER — PAROXETINE 20 MG/1
TABLET, FILM COATED ORAL
Qty: 90 TABLET | Refills: 0 | Status: SHIPPED | OUTPATIENT
Start: 2023-07-28 | End: 2023-10-31

## 2023-08-04 DIAGNOSIS — E78.5 DYSLIPIDEMIA: ICD-10-CM

## 2023-08-04 DIAGNOSIS — E03.9 ACQUIRED HYPOTHYROIDISM: ICD-10-CM

## 2023-08-04 RX ORDER — LEVOTHYROXINE SODIUM 50 UG/1
50 TABLET ORAL DAILY
Qty: 30 TABLET | Refills: 0 | Status: SHIPPED | OUTPATIENT
Start: 2023-08-04 | End: 2023-09-13

## 2023-08-04 RX ORDER — ATORVASTATIN CALCIUM 10 MG/1
10 TABLET, FILM COATED ORAL DAILY
Qty: 30 TABLET | Refills: 0 | Status: SHIPPED | OUTPATIENT
Start: 2023-08-04 | End: 2023-10-13

## 2023-08-04 NOTE — TELEPHONE ENCOUNTER
Patient calling regarding refill requests for Atorvastatin and Levothyroxine. Patient states she is currently out of her medications.    Medications and pharmacy have been pended for provider to review.    BOAZ Hobbs  [facility] Primary [facility] Triage

## 2023-08-28 ENCOUNTER — TELEPHONE (OUTPATIENT)
Dept: GASTROENTEROLOGY | Facility: CLINIC | Age: 63
End: 2023-08-28

## 2023-08-28 DIAGNOSIS — Z12.11 ENCOUNTER FOR SCREENING COLONOSCOPY: Primary | ICD-10-CM

## 2023-08-28 RX ORDER — BISACODYL 5 MG/1
TABLET, DELAYED RELEASE ORAL
Qty: 4 TABLET | Refills: 0 | Status: SHIPPED | OUTPATIENT
Start: 2023-08-28 | End: 2024-02-13

## 2023-08-28 NOTE — TELEPHONE ENCOUNTER
Pre visit planning completed.      Procedure details:    Patient scheduled for Colonoscopy  on 9/13/23.     Arrival time: 1255. Procedure time 1340    Pre op exam needed? N/A    Facility location: [facility]; [address], [place] [address]    Sedation type: MAC    Indication for procedure: Screening      Chart review:     Electronic implanted devices? No    Diabetic? No      Medication review:    Anticoagulants? No    NSAIDS? No NSAID medications per patient's medication list.  RN will verify with pre-assessment call.    Other medication HOLDING recommendations:  N/A      Prep for procedure:     Bowel prep recommendation: Extended prep Golytely    Due to:  chronic pain medication noted in chart.  (On Suboxone, also chronic constipated noted in chart as well)    Prep instructions sent via letter Bowel prep script sent to    [id number] - [place], [place] - [address]  Endoscopy Procedure Pre Assessment RN  [phone] option 4

## 2023-08-28 NOTE — LETTER
August 29, 2023      Sapphire Vivar  [address]  [facility] [address]              Dear Sapphire Leary,     We apologize that we have been unable to contact you by phone. We are calling in regards to your upcoming Colonoscopy  procedure that is scheduled on 9/13/23 to complete pre assessment.    Please contact our pre assessment nursing team at your earliest convenience at [phone] option 4. We are open Monday through Friday, 7:00am to 5:00pm. Note that failure to complete Nurse assessment phone call may result in your procedure being cancelled.     Our schedules fill up quickly and are in high demand. If you know that you need to cancel, please contact us so that we can accommodate scheduling for other patients. Our endoscopy scheduling team can be reached at [phone] option 2.       Colonoscopy      Procedure date: 9/13/23    Anticipated arrival time: 12:55 PM   (Procedure Times are Subject to Change)    Facility location: [facility] [place]; [address]nd Floor, [place] [address] - Check in location: 2nd Floor at Surgery desk      Important Procedure Reminders:     Prep Instructions:   Instructions on how to prepare for your upcoming procedure are found below. Please read instructions carefully. Deviation from instructions may result in less than desired outcomes and procedure may need to be rescheduled. If you have additional questions regarding how to prepare for your upcoming procedure, please contact our endoscopy pre assessment nurses at [phone] option 4.      Policy:   On the day of your procedure, please designatean adult(s) who can drive you home stay with you for the next 24 hours. The medicines used in the exam will make you sleepy. You will not be able to drive. You cannot take public transportation, ride share services, or non-medical taxi service without a responsible caregiver.  Medical transport services are allowed with the  requirement that a responsible caregiver will receive you at your destination.  We require that drivers and caregivers are confirmed prior to your procedure.    Day of procedure:  Please do not wear jewelry (i.e. earrings, rings, necklaces, watches, etc) . Leave your purse, billfold, credit cards, and other valuables at home.   Bring insurance card and ID.     To cancel or reschedule your procedure:   Please call our endoscopy scheduling team at: [facility]: [phone], option 2. Monday through Friday, 7:00am-5:00pm.      Medication Reminders:    Please note the following medication holding recommendations:   N/A      Golytely Extended Colonoscopy Prep  Prep instructions for colonoscopy   Pre-Assessment Phone Number: [facility]; [phone] option 4      Please read these instructions carefully at least 7 days prior to your colonoscopy procedure. Be sure to follow all directions completely. The inside of your colon must be clean to allow for a complete examination for the presence of any growths, polyps, and/or abnormalities, as well as their biopsy or removal. A number of tips are included in order to make this part of the procedure as comfortable as possible.    Immediately:  You will receive a call from a Nurse to review instructions and health history.  This assessment must be completed prior to your procedure.  Failure to complete the Nurse assessment may result in the procedure being cancelled.  You must arrange for an adult to drive you home after your exam. Your colonoscopy cannot be done unless you have a ride. If you need to use public transportation, someone must ride with you and stay with you for a minimum of 6-24 hours.  Check with your insurance company to be sure they will cover this exam.Arrange for a responsible adult to drive you home on the day of the exam. This cannot be a taxi or a bus as you will need someone with you after the  procedure.    7 days prior:  Talk to your prescribing provider: If you take blood thinners (such as Coumadin, Plavix, Xarelto, Eliquis, Lovenox, or others), these medications may need to be stopped temporarily before your procedure. Your prescribing provider will tell you what to do.   Talk to your prescribing provider: If you take prescription NSAIDS (such as Sulindac, Celebrex, Mobic, Relafen). Your prescribing provider will tell you what to do.   If you have diabetes, you should request an early morning appointment.  Stop taking fiber supplements and multivitamins containing iron, or any other medications containing iron.  Fill your prescription for 2 containers of Golytely and 4 Dulcolax tablets at the pharmacy.  It is very important that you stay well hydrated during the colonoscopy prep. The Golytely bowel prep is designed to clean out your colon, but it will not provide hydration. While you are taking the prescribed prep, you should also drink 64 oz. of Gatorade or similar sports drink product to drink for staying hydrated. (Avoid red and purple colors)  Stop eating corn, popcorn, nuts and foods with seeds.   Begin a restricted or low fiber diet (see list below).    2 days prior:  Drink fluids to be well hydrated, this is important. Drink at least 4 large glasses of water, Gatorade, or other similar sports drinks.  It is a good idea to use Vaseline on the skin around your anus after each bowel movement to prevent irritation. Wet wipes also help to reduce irritation.   You can have a light, low-fiber breakfast. You may also have a light, low-fiber lunch.  No solid foods after 1pm. Begin a clear liquid diet. (see list below).  At 4pm, take 2 Dulcolax (bisacodyl) tablets.  At 5pm, mix and drink half of a jug of Golytely bowel prep. Drink an 8 oz. Glass of Golytely every 10-15 minutes until half of the jug is gone. Place the remainder of the Golytely in the refrigerator. Stay close to the bathroom.     1 day  prior:  Continue clear liquid diet only (see examples below). Do not eat solid food this day.  Do not drink any red or purple colored drinks.  Stop taking NSAID pain relievers, such as Advil, Ibuprofen, Motrin, etc.  You may take Tylenol.  At 4 pm, take 2 Dulcolax (bisacodyl) tablets.  At 5 pm, drink the 2nd half of a jug of Golytely bowel prep. Drink an 8 oz. glass of Golytely every 10-15 minutes until the 1st jug of Golytely is gone.   The Golytely bowel prep will not keep you hydrated. You should drink 8-10 glasses of clear liquids throughout the day.  Before you go to bed, mix the 2nd container of Golytely and place in the refrigerator for the morning.      Procedure day:  6 hours before your check-in time, drink an 8 oz. Glass of Golytely every 10-15 minutes until half of the 2nd jug of Golytely is gone. You WILL NOT drink the entire 2nd jug of Golytely.   You may take your necessary morning medications with sips of water  Do not take diabetes medicine by mouth until after your exam.  You may drink clear liquids only up until 2 hours before your arrival time.  Do not smoke, chew tobacco, or swallow anything, including water or gum for at least 2 hours before your arrival time. This is a safety issue. Your procedure could be cancelled if you do not follow directions.  Please do not wear jewelry (i.e. earrings, rings, necklaces, watches, etc) . Leave your purse, billfold, credit cards, and other valuables at home.   Please arrive with a responsible adult who can take you home after the test and stay with you for a minimum of 24 hours: The medicine used will make you sleepy and forgetful. If you do not have someone to take you home, we will cancel your procedure. If using public transportation you must have someone to ride with you.  Please perform your nebulizer treatments and airway clearance therapy in the morning prior to the procedure (if applicable).  If you have asthma, bring your inhalers.    CLEAR LIQUID  DIET   You may have:  Water, tea, coffee (no milk or cream)  Soda pop, Gatorade (not red or purple)  Jell-O, Popsicles (no milk or fruit pieces - not red or purple)  Fat-free soup broth or bouillon  Plain hard candy, such as clear life savers (not red or purple)  Clear juices and fruit-flavored drinks, such as apple juice, white grape juice, Hi-C, and Hipolito-Aid (not red or purple)   Do not have:  Milk or milk products such as ice cream, malts or shakes, or coffee creamer  Red or purple drinks of any kind such as cranberry juice or grape juice. Avoid red or purple Jell-O, Popsicles, Hipolito-Aid, sorbet, sherbet and candy  Juices with pulp such as orange, grapefruit, pineapple or tomato juice  Cream soups of any kind  Alcohol and beer  Protein drinks or protein powder     LOW FIBER DIET   You may have:    Starches: White bread, rolls, biscuits, croissants, Gricel toast, white flour tortillas, waffles, pancakes, Beninese toast; white rice, noodles, pasta, macaroni; cooked and peeled potatoes; plain crackers, saltines; cooked farina or cream of rice; puffed rice, corn flakes, Rice Krispies, Special K   Vegetables: tender cooked and canned, vegetable broths  Fruits and fruit juices: Strained fruit juice, canned fruit without seeds or skin (not pineapple), applesauce, pear sauce, ripe bananas, melons (not watermelon)   Milk products: Milk (plain or flavored), cheese, cottage cheese, yogurt (no berries), custard, ice cream    Proteins: Tender, well-cooked ground beef, lamb, veal, ham, pork, chicken, turkey, fish or organ meats; eggs; creamy peanut butter   Fats and condiments:  Margarine, butter, oils, mayonnaise, sour cream, salad dressing, plain gravy; spices, cooked herbs; sugar, clear jelly, honey, syrup   Snacks, sweets and drinks: Pretzels, hard candy; plain cakes and cookies (no nuts or seeds); gelatin, plain pudding, sherbet, Popsicles; coffee, tea, carbonated ( fizzy ) drinks Do not have:    Starches: Breads or rolls  that contain nuts, seeds or fruit; whole wheat or whole grain breads that contain more than 1 gram of fiber per slice; cornbread; corn or whole wheat tortillas; potatoes with skin; brown rice, wild rice, kasha (buckwheat), and oatmeal   Vegetables: Any raw or steamed vegetables; vegetables with seeds; corn in any form   Fruits and fruit juices: Prunes, prune juice, raisins and other dried fruits, berries and other fruits with seeds, canned pineapple juices with pulp such as orange, grapefruit, pineapple or tomato juice  Milk products: Any yogurt with nuts, seeds or berries   Proteins: Tough, fibrous meats with gristle; cooked dried beans, peas or lentils; crunchy peanut butter  Fats and condiments: Pickles, olives, relish, horseradish; jam, marmalade, preserves   Snacks, sweets and drinks: Popcorn, nuts, seeds, granola, coconut, candies made with nuts or seeds; all desserts that contain nuts, seeds, raisins and other dried fruits, coconut, whole grains or bran.      FAQ:    How do you know if your colon is cleaned out?   After completing the bowel prep, your bowel movements should be all liquid and yellow. Your bowel movements will look similar to urine in the toilet. If there are pieces of stool (poop) in the toilet, or if you can't see to the bottom of the toilet, please call our office for advice. Call [phone] and ask to speak with a nurse.   Why do you need a responsible  to take you home and stay with you?  We require a responsible adult to take you home for your safety. The sedation medicines used to relax you during the procedure can impair your judgement and reaction time, make you forgetful and possible a little unsteady. Do not drive, make any important decisions, or sign any legal documents for 24 hours after your procedure.   It is normal to feel bloated and gassy after your procedure. Walking will help move the air through your colon. You can take non-aspirin pain relievers that contain  acetaminophen (Tylenol).   When can you eat after your procedure?  You may resume your normal diet when you feel ready, unless advised otherwise by the doctor performing your procedure. Do not drink alcohol for 24 hours after your procedure.   You many resume normal activities (work, exercise, etc.) after 24 hours.   When will you get test results?  You should have your procedure results and any lab results (if applicable) by letter, [employer] message, or phone call within 2 weeks. If you have any questions, please call the doctor that referred you for the procedure.       Thank you for choosing [facility] for your procedure. If you are sent a survey regarding your care, please take the time to complete the questionnaire. We value your feedback!

## 2023-09-05 NOTE — TELEPHONE ENCOUNTER
Second call attempt to complete pre assessment.     No answer.  Left message to return call to [phone] #4 within 24 hours or risk procedure being cancelled.     Additional information needed?  N/A      Aida Gutierrez RN  Endoscopy Procedure Pre Assessment RN

## 2023-09-06 ENCOUNTER — TELEPHONE (OUTPATIENT)
Dept: GASTROENTEROLOGY | Facility: CLINIC | Age: 63
End: 2023-09-06
Payer: COMMERCIAL

## 2023-09-06 NOTE — TELEPHONE ENCOUNTER
No return call received.   Pre assessment was not completed for upcoming scheduled procedure.     Staff message sent to endoscopy scheduling to cancel procedure per policy.       Aida Gutierrez RN   Endoscopy Procedure Pre Assessment RN

## 2023-09-06 NOTE — TELEPHONE ENCOUNTER
----- Message from Aida Gutierrez RN sent at 9/6/2023 10:52 AM CDT -----  Regarding: Cancel  Pre assessment was not completed for upcoming Colonoscopy  scheduled on 09.13.2023.    Please cancel per policy.       Thank you,     Aida Gutierrez RN  Endoscopy Procedure Pre Assessment RN  [phone] option 4

## 2023-09-06 NOTE — TELEPHONE ENCOUNTER
Reason for Reschedule/Cancellation (please be detailed, any staff messages or encounters to note?): per message below pt did not return pre assessment call       Prior to reschedule please review:  Ordering Provider:     Miley [facility]r [facility]     Sedation per order: moderate  Does patient have any ASC Exclusions, please identify?: n      Notes on Cancelled Procedure:  Procedure: Lower Endoscopy [Colonoscopy]   Date: 09/13/2023  Location: [facility] [place]; [address] [place], [place] [address], [address] Floor, [place] [address]  Surgeon: Callie      Rescheduled: No

## 2023-09-13 DIAGNOSIS — E55.9 VITAMIN D INSUFFICIENCY: ICD-10-CM

## 2023-09-13 DIAGNOSIS — E03.9 ACQUIRED HYPOTHYROIDISM: ICD-10-CM

## 2023-09-13 RX ORDER — CHOLECALCIFEROL (VITAMIN D3) 50 MCG
1 TABLET ORAL DAILY
Qty: 90 TABLET | Refills: 0 | Status: SHIPPED | OUTPATIENT
Start: 2023-09-13 | End: 2023-12-11

## 2023-09-13 NOTE — TELEPHONE ENCOUNTER
Fax message: Pt looking for new RX. Prev [facility] pharmacy did not have any refills.

## 2023-09-14 RX ORDER — LEVOTHYROXINE SODIUM 50 UG/1
50 TABLET ORAL DAILY
Qty: 30 TABLET | Refills: 0 | Status: SHIPPED | OUTPATIENT
Start: 2023-09-14 | End: 2023-10-16

## 2023-09-23 ENCOUNTER — HOSPITAL ENCOUNTER (EMERGENCY)
Facility: CLINIC | Age: 63
Discharge: HOME | End: 2023-09-23
Attending: FAMILY MEDICINE | Admitting: FAMILY MEDICINE
Payer: COMMERCIAL

## 2023-09-23 VITALS
TEMPERATURE: 98.3 F | BODY MASS INDEX: 30.36 KG/M2 | SYSTOLIC BLOOD PRESSURE: 99 MMHG | HEIGHT: 62 IN | DIASTOLIC BLOOD PRESSURE: 68 MMHG | RESPIRATION RATE: 16 BRPM | OXYGEN SATURATION: 96 % | HEART RATE: 105 BPM | WEIGHT: 165 LBS

## 2023-09-23 DIAGNOSIS — F11.90 OPIOID USE DISORDER: ICD-10-CM

## 2023-09-23 PROCEDURE — 99284 EMERGENCY DEPT VISIT MOD MDM: CPT | Performed by: FAMILY MEDICINE

## 2023-09-23 PROCEDURE — 250N000013 HC RX MED GY IP 250 OP 250 PS 637: Performed by: FAMILY MEDICINE

## 2023-09-23 RX ORDER — BUPRENORPHINE AND NALOXONE 8; 2 MG/1; MG/1
3 FILM, SOLUBLE BUCCAL; SUBLINGUAL DAILY
Qty: 9 FILM | Refills: 0 | Status: SHIPPED | OUTPATIENT
Start: 2023-09-23 | End: 2023-11-02

## 2023-09-23 RX ORDER — BUPRENORPHINE AND NALOXONE 8; 2 MG/1; MG/1
3 FILM, SOLUBLE BUCCAL; SUBLINGUAL ONCE
Status: COMPLETED | OUTPATIENT
Start: 2023-09-23 | End: 2023-09-23

## 2023-09-23 RX ADMIN — BUPRENORPHINE AND NALOXONE 3 FILM: 8; 2 FILM, SOLUBLE BUCCAL; SUBLINGUAL at 16:00

## 2023-09-23 NOTE — ED PROVIDER NOTES
ED Provider Note  [facility]      History     Chief Complaint   Patient presents with    Medication Refill     Here for suboxone prescription     HPI  Sapphire Vivar is a 63 year old female who has a history of opiate use disorder, states she has been maintained on Suboxone on a longstanding basis.  Recent dosage three 8-2 mg films daily.  She ran out of the films 3 days ago, took a half a film yesterday and is beginning to experience opiate withdrawal symptoms.  Denies any history of recent drug relapse.  Has had numerous recent social stressors causing her to be without a refill.  Has been followed in the recovery clinic here at [facility].  Denies any other acute complaints and is requesting only a bridging prescription for the weekend.    Past Medical History  Past Medical History:   Diagnosis Date    Anxiety and depression     History of pulmonary embolism     after severe Covid infection - on Eliquis    Hypothyroidism     Lumbago     Opioid use disorder     Tobacco use disorder      Past Surgical History:   Procedure Laterality Date    HYSTERECTOMY VAGINAL  03/04/2009    NO HISTORY OF SURGERY       buprenorphine HCl-naloxone HCl (SUBOXONE) 8-2 MG per film  buprenorphine HCl-naloxone HCl (SUBOXONE) 8-2 MG per film  levothyroxine (SYNTHROID/LEVOTHROID) 50 MCG tablet  naloxone (NARCAN) 4 MG/0.1ML nasal spray  PARoxetine (PAXIL) 20 MG tablet  traZODone (DESYREL) 150 MG tablet  atorvastatin (LIPITOR) 10 MG tablet  bisacodyl (DULCOLAX) 5 MG EC tablet  naloxone (NARCAN) 4 MG/0.1ML nasal spray  polyethylene glycol (GOLYTELY) 236 g suspension  Vitamin D3 50 mcg (2000 units) tablet      Allergies   Allergen Reactions    Pcn [Penicillins] Rash     Family History  Family History   Problem Relation Age of Onset    Cancer Mother         lung    Diabetes Father     Cancer Father         skin     Social History   Social History     Tobacco Use    Smoking status: Every Day     Packs/day: 1.00     Years:  "20.00     Pack years: 20.00     Types: Cigarettes    Smokeless tobacco: Never    Tobacco comments:     patient wuit for 27 years and started again    Vaping Use    Vaping Use: Never used   Substance Use Topics    Alcohol use: No    Drug use: No         A medically appropriate review of systems was performed with pertinent positives and negatives noted in the HPI, and all other systems negative.    Physical Exam   BP: 99/68  Pulse: 105  Temp: 98.3  F (36.8  C)  Resp: 16  Height: 157.5 cm (5' 2\")  Weight: 74.8 kg (165 lb)  SpO2: 96 %  Physical Exam  General: Patient is in no acute distress and is resting comfortably.  HEENT: Normocephalic atraumatic  Neck: Supple  Cardiovascular: Heart rate normal  Pulmonary: Patient is in no respiratory distress  Extremities: No signs of any significant or life-threatening trauma.  Neurologic: No new focal neurologic deficits.      ED Course, Procedures, & Data      Procedures                     No results found for any visits on 09/23/23.  Medications   buprenorphine HCl-naloxone HCl (SUBOXONE) 8-2 MG per film 3 Film (has no administration in time range)     Labs Ordered and Resulted from Time of ED Arrival to Time of ED Departure - No data to display  No orders to display          Critical care was not performed.     Medical Decision Making  The patient's presentation was of low complexity (a stable chronic illness).    The patient's evaluation involved:  review of external note(s) from 1 sources (reviewed prior notes from recovery clinic in [facility])    The patient's management necessitated moderate risk (prescription drug management including medications given in the ED).    Assessment & Plan    Patient with severe opiate use disorder, on Suboxone maintenance, due to social reasons has found herself without a refill on her Suboxone.  She is requesting a bridging prescription for the weekend and reports current dosage 3 films daily, was given that dose in the ED and given a 2-day " prescription with instructions to follow-up with the recovery clinic this week.  There is not appear to be any other acute issues or complaints.  We discussed the indications for emergency department return and follow-up.  Stable for discharge.      I have reviewed the nursing notes. I have reviewed the findings, diagnosis, plan and need for follow up with the patient.    New Prescriptions    BUPRENORPHINE HCL-NALOXONE HCL (SUBOXONE) 8-2 MG PER FILM    Place 3 Film under the tongue daily    NALOXONE (NARCAN) 4 MG/0.1ML NASAL SPRAY    Spray 1 spray (4 mg) into one nostril alternating nostrils as needed for opioid reversal every 2-3 minutes until assistance arrives       Final diagnoses:   Opioid use disorder       Cuong Bonds MD  [facility] EMERGENCY DEPARTMENT  9/23/2023     Cuong Bonds MD  09/23/23 [id number]

## 2023-09-23 NOTE — DISCHARGE INSTRUCTIONS
Follow-up Monday at the Recovery Clinic, [phone], [address] [place] [address], [id number] ([address], first floor [facility])

## 2023-10-02 ENCOUNTER — OFFICE VISIT (OUTPATIENT)
Dept: BEHAVIORAL HEALTH | Facility: CLINIC | Age: 63
End: 2023-10-02
Payer: COMMERCIAL

## 2023-10-13 DIAGNOSIS — E78.5 DYSLIPIDEMIA: ICD-10-CM

## 2023-10-15 RX ORDER — ATORVASTATIN CALCIUM 10 MG/1
10 TABLET, FILM COATED ORAL DAILY
Qty: 30 TABLET | Refills: 0 | Status: SHIPPED | OUTPATIENT
Start: 2023-10-15 | End: 2023-11-21

## 2023-10-16 DIAGNOSIS — E03.9 ACQUIRED HYPOTHYROIDISM: ICD-10-CM

## 2023-10-16 RX ORDER — LEVOTHYROXINE SODIUM 50 UG/1
50 TABLET ORAL DAILY
Qty: 30 TABLET | Refills: 0 | Status: SHIPPED | OUTPATIENT
Start: 2023-10-16 | End: 2023-11-13

## 2023-10-16 NOTE — TELEPHONE ENCOUNTER
Rx refill request came from patient's pharmacy for Levothyroxine 0.05 mg     BERNARDINO Streeter

## 2023-10-31 ENCOUNTER — TELEPHONE (OUTPATIENT)
Dept: NURSING | Facility: CLINIC | Age: 63
End: 2023-10-31
Payer: COMMERCIAL

## 2023-10-31 DIAGNOSIS — F41.1 ANXIETY STATE: ICD-10-CM

## 2023-10-31 DIAGNOSIS — F33.1 MODERATE EPISODE OF RECURRENT MAJOR DEPRESSIVE DISORDER (H): ICD-10-CM

## 2023-10-31 NOTE — TELEPHONE ENCOUNTER
Patient calling to request a refill of her Paroxetine.Patient is out completely. Patient was seen last in clinic on 3/13/23. Medication and pharmacy pended. Routed refill request to refill team.    Julia Moran RN  10/31/23 12:48 PM  [facility] Nurse Advisor

## 2023-10-31 NOTE — TELEPHONE ENCOUNTER
Patient calling to request a refill of her Paroxetine. Refill request submitted (please see refill encounter).     Julia Moran RN  10/31/23 12:46 PM  [facility] Nurse Advisor

## 2023-11-01 RX ORDER — PAROXETINE 20 MG/1
20 TABLET, FILM COATED ORAL DAILY
Qty: 90 TABLET | Refills: 3 | Status: SHIPPED | OUTPATIENT
Start: 2023-11-01 | End: 2024-01-30

## 2023-11-02 ENCOUNTER — OFFICE VISIT (OUTPATIENT)
Dept: BEHAVIORAL HEALTH | Facility: CLINIC | Age: 63
End: 2023-11-02
Payer: COMMERCIAL

## 2023-11-02 ENCOUNTER — HOSPITAL ENCOUNTER (EMERGENCY)
Facility: CLINIC | Age: 63
End: 2023-11-02
Payer: COMMERCIAL

## 2023-11-02 VITALS — HEART RATE: 64 BPM | DIASTOLIC BLOOD PRESSURE: 78 MMHG | SYSTOLIC BLOOD PRESSURE: 120 MMHG

## 2023-11-02 DIAGNOSIS — Z51.81 ENCOUNTER FOR MONITORING OPIOID MAINTENANCE THERAPY: ICD-10-CM

## 2023-11-02 DIAGNOSIS — Z79.891 ENCOUNTER FOR MONITORING OPIOID MAINTENANCE THERAPY: ICD-10-CM

## 2023-11-02 DIAGNOSIS — F17.200 TOBACCO USE DISORDER: ICD-10-CM

## 2023-11-02 DIAGNOSIS — F11.20 OPIOID USE DISORDER, SEVERE, DEPENDENCE (H): Primary | ICD-10-CM

## 2023-11-02 LAB
AMPHETAMINE QUAL URINE POCT: NEGATIVE
BARBITURATE QUAL URINE POCT: NEGATIVE
BENZODIAZEPINE QUAL URINE POCT: NEGATIVE
BUPRENORPHINE QUAL URINE POCT: ABNORMAL
COCAINE QUAL URINE POCT: NEGATIVE
CREATININE QUAL URINE POCT: ABNORMAL
INTERNAL QC QUAL URINE POCT: ABNORMAL
MDMA QUAL URINE POCT: NEGATIVE
METHADONE QUAL URINE POCT: NEGATIVE
METHAMPHETAMINE QUAL URINE POCT: NEGATIVE
OPIATE QUAL URINE POCT: NEGATIVE
OXYCODONE QUAL URINE POCT: NEGATIVE
PH QUAL URINE POCT: ABNORMAL
PHENCYCLIDINE QUAL URINE POCT: NEGATIVE
POCT KIT EXPIRATION DATE: ABNORMAL
POCT KIT LOT NUMBER: ABNORMAL
SPECIFIC GRAVITY POCT: 1.01
TEMPERATURE URINE POCT: ABNORMAL
THC QUAL URINE POCT: NEGATIVE

## 2023-11-02 PROCEDURE — 99214 OFFICE O/P EST MOD 30 MIN: CPT | Performed by: NURSE PRACTITIONER

## 2023-11-02 PROCEDURE — 99000 SPECIMEN HANDLING OFFICE-LAB: CPT

## 2023-11-02 PROCEDURE — G0480 DRUG TEST DEF 1-7 CLASSES: HCPCS | Mod: 90

## 2023-11-02 PROCEDURE — 80305 DRUG TEST PRSMV DIR OPT OBS: CPT | Performed by: NURSE PRACTITIONER

## 2023-11-02 RX ORDER — BUPRENORPHINE AND NALOXONE 8; 2 MG/1; MG/1
1 FILM, SOLUBLE BUCCAL; SUBLINGUAL 3 TIMES DAILY
Qty: 75 FILM | Refills: 0 | Status: SHIPPED | OUTPATIENT
Start: 2023-11-02 | End: 2024-02-13

## 2023-11-02 ASSESSMENT — PATIENT HEALTH QUESTIONNAIRE - PHQ9: SUM OF ALL RESPONSES TO PHQ QUESTIONS 1-9: 3
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
## 2023-11-02 NOTE — NURSING NOTE
[facility]      Rooming information:  Approximate last use of full opioid agonist: 6 years ago  Taking buprenorphine? Yes:   How much per day? 24mg  Number of buprenorphine films/tablets remaining currently: 0  Side effects related to buprenorphine (constipation, dry mouth, sedation?) No   Narcan currently available: No  Other recent substance use:    Denies  NICOTINE-Yes:   If using nicotine, ready to quit? Yes: working    Point of care urine drug screen positive for:  Lab Results   Component Value Date    BUP Screen Positive (A) 11/02/2023    BZO Negative 11/02/2023    BAR Negative 11/02/2023    THEODORA Negative 11/02/2023    MAMP Negative 11/02/2023    AMP Negative 11/02/2023    MDMA Negative 11/02/2023    MTD Negative 11/02/2023    IQP783 Negative 11/02/2023    OXY Negative 11/02/2023    PCP Negative 11/02/2023    THC Negative 11/02/2023    TEMP 90 F 11/02/2023    SGPOCT 1.015 11/02/2023       *POC urine drug screen does not screen for Fentanyl    Pregnancy Status   LMP:   Birth control/barriers:   Interested in birth control if none currently? No  Urine pregnancy test specimen obtained and sent to lab:      11/2/2023    10:00 AM   PHQ Assesment Total Score(s)   PHQ-9 Score 3       If PHQ-9 score of 15 or higher, has [facility] therapist or provider been notified? No    Any current suicidal ideation? No  If yes, has [facility] therapist or provider been notified? N/A    Primary care provider: Physician No Ref-Primary     Mental health provider: none (follow up on MH referral if needed)    Insurance needs: active    Housing needs: stable    Current legal issues: none    Contact information up to date? yes    3rd Party Involvement not today (please obtain SASHA if pt would like to include)    Louie Mcqueen MA  November 2, 2023  10:37 AM

## 2023-11-02 NOTE — PATIENT INSTRUCTIONS
Getting sublocade 11/27/23 at 1130. Continue suboxone 8 mg three times daily until 24 hours following your injection.     Follow up in [facility] 11/27/23 100 before injection.     Plan to complete labs.

## 2023-11-05 LAB
BUPRENORPHINE UR-MCNC: 105 NG/ML
BUPRENORPHINE UR-MCNC: 482 NG/ML
NALOXONE UR CFM-MCNC: <100 NG/ML
NORBUPRENORPHINE UR CFM-MCNC: >1000 NG/ML
NORBUPRENORPHINE UR-MCNC: 814 NG/ML

## 2023-11-13 DIAGNOSIS — E03.9 ACQUIRED HYPOTHYROIDISM: ICD-10-CM

## 2023-11-13 RX ORDER — LEVOTHYROXINE SODIUM 50 UG/1
50 TABLET ORAL DAILY
Qty: 30 TABLET | Refills: 0 | Status: SHIPPED | OUTPATIENT
Start: 2023-11-13 | End: 2023-12-11

## 2023-11-21 ENCOUNTER — TELEPHONE (OUTPATIENT)
Dept: BEHAVIORAL HEALTH | Facility: CLINIC | Age: 63
End: 2023-11-21
Payer: COMMERCIAL

## 2023-11-21 DIAGNOSIS — E78.5 DYSLIPIDEMIA: ICD-10-CM

## 2023-11-22 RX ORDER — ATORVASTATIN CALCIUM 10 MG/1
10 TABLET, FILM COATED ORAL DAILY
Qty: 30 TABLET | Refills: 0 | Status: SHIPPED | OUTPATIENT
Start: 2023-11-22 | End: 2023-12-27

## 2023-11-29 ENCOUNTER — INFUSION THERAPY VISIT (OUTPATIENT)
Dept: INFUSION THERAPY | Facility: CLINIC | Age: 63
End: 2023-11-29
Attending: FAMILY MEDICINE
Payer: COMMERCIAL

## 2023-11-29 VITALS — HEART RATE: 90 BPM | DIASTOLIC BLOOD PRESSURE: 75 MMHG | SYSTOLIC BLOOD PRESSURE: 124 MMHG | OXYGEN SATURATION: 92 %

## 2023-11-29 DIAGNOSIS — F11.20 OPIOID USE DISORDER, SEVERE, DEPENDENCE (H): Primary | ICD-10-CM

## 2023-11-29 PROCEDURE — 250N000009 HC RX 250: Performed by: FAMILY MEDICINE

## 2023-11-29 PROCEDURE — 96372 THER/PROPH/DIAG INJ SC/IM: CPT | Performed by: NURSE PRACTITIONER

## 2023-11-29 PROCEDURE — 250N000011 HC RX IP 250 OP 636: Mod: JZ | Performed by: NURSE PRACTITIONER

## 2023-11-29 RX ORDER — LIDOCAINE HYDROCHLORIDE 10 MG/ML
2 INJECTION, SOLUTION EPIDURAL; INFILTRATION; INTRACAUDAL; PERINEURAL ONCE
Status: COMPLETED | OUTPATIENT
Start: 2023-11-29 | End: 2023-11-29

## 2023-11-29 RX ORDER — ALBUTEROL SULFATE 0.83 MG/ML
2.5 SOLUTION RESPIRATORY (INHALATION)
Status: CANCELLED | OUTPATIENT
Start: 2023-12-12

## 2023-11-29 RX ORDER — EPINEPHRINE 1 MG/ML
0.3 INJECTION, SOLUTION, CONCENTRATE INTRAVENOUS EVERY 5 MIN PRN
Status: CANCELLED | OUTPATIENT
Start: 2023-12-12

## 2023-11-29 RX ORDER — METHYLPREDNISOLONE SODIUM SUCCINATE 125 MG/2ML
125 INJECTION, POWDER, LYOPHILIZED, FOR SOLUTION INTRAMUSCULAR; INTRAVENOUS
Status: CANCELLED
Start: 2023-12-12

## 2023-11-29 RX ORDER — ALBUTEROL SULFATE 90 UG/1
1-2 AEROSOL, METERED RESPIRATORY (INHALATION)
Status: CANCELLED
Start: 2023-12-12

## 2023-11-29 RX ORDER — MEPERIDINE HYDROCHLORIDE 25 MG/ML
25 INJECTION INTRAMUSCULAR; INTRAVENOUS; SUBCUTANEOUS EVERY 30 MIN PRN
Status: CANCELLED | OUTPATIENT
Start: 2023-12-12

## 2023-11-29 RX ORDER — LIDOCAINE HYDROCHLORIDE 10 MG/ML
2 INJECTION, SOLUTION EPIDURAL; INFILTRATION; INTRACAUDAL; PERINEURAL ONCE
Status: CANCELLED | OUTPATIENT
Start: 2023-12-12 | End: 2023-12-12

## 2023-11-29 RX ORDER — DIPHENHYDRAMINE HYDROCHLORIDE 50 MG/ML
50 INJECTION INTRAMUSCULAR; INTRAVENOUS
Status: CANCELLED
Start: 2023-12-12

## 2023-11-29 RX ADMIN — BUPRENORPHINE 300 MG: 300 SOLUTION SUBCUTANEOUS at 12:49

## 2023-11-29 RX ADMIN — LIDOCAINE HYDROCHLORIDE 2 ML: 10 INJECTION, SOLUTION EPIDURAL; INFILTRATION; INTRACAUDAL; PERINEURAL at 12:45

## 2023-11-29 NOTE — PROGRESS NOTES
Infusion Nursing Note:  Sapphire OPHELIA Bobcharles presents today for sublocade.    Patient seen by provider today: No   present during visit today: Not Applicable.    Note: No opioids use or withdrawal. Pt has been taking suboxone. Lidocaine and sublocade given RLQ.      Intravenous Access:  No Intravenous access/labs at this visit.    Treatment Conditions:  Not Applicable.      Post Infusion Assessment:  Patient tolerated injection without incident.       Discharge Plan:   Discharge instructions reviewed with: Patient.  Patient and/or family verbalized understanding of discharge instructions and all questions answered.  Patient discharged in stable condition accompanied by: self.  Departure Mode: Ambulatory.      Jocy Driscoll RN

## 2023-12-11 DIAGNOSIS — E03.9 ACQUIRED HYPOTHYROIDISM: ICD-10-CM

## 2023-12-11 DIAGNOSIS — E55.9 VITAMIN D INSUFFICIENCY: ICD-10-CM

## 2023-12-12 RX ORDER — CHOLECALCIFEROL (VITAMIN D3) 50 MCG
1 TABLET ORAL DAILY
Qty: 90 TABLET | Refills: 0 | Status: SHIPPED | OUTPATIENT
Start: 2023-12-12 | End: 2024-01-30

## 2023-12-12 RX ORDER — LEVOTHYROXINE SODIUM 50 UG/1
50 TABLET ORAL DAILY
Qty: 30 TABLET | Refills: 0 | Status: SHIPPED | OUTPATIENT
Start: 2023-12-12 | End: 2024-01-12

## 2023-12-27 DIAGNOSIS — E78.5 DYSLIPIDEMIA: ICD-10-CM

## 2023-12-27 RX ORDER — ATORVASTATIN CALCIUM 10 MG/1
10 TABLET, FILM COATED ORAL DAILY
Qty: 30 TABLET | Refills: 0 | Status: SHIPPED | OUTPATIENT
Start: 2023-12-27 | End: 2024-01-30

## 2024-01-04 ENCOUNTER — INFUSION THERAPY VISIT (OUTPATIENT)
Dept: INFUSION THERAPY | Facility: CLINIC | Age: 64
End: 2024-01-04
Attending: FAMILY MEDICINE
Payer: COMMERCIAL

## 2024-01-04 VITALS
RESPIRATION RATE: 16 BRPM | OXYGEN SATURATION: 95 % | SYSTOLIC BLOOD PRESSURE: 118 MMHG | TEMPERATURE: 98.3 F | DIASTOLIC BLOOD PRESSURE: 76 MMHG | HEART RATE: 85 BPM

## 2024-01-04 DIAGNOSIS — F11.20 OPIOID USE DISORDER, SEVERE, DEPENDENCE (H): Primary | ICD-10-CM

## 2024-01-04 PROCEDURE — 250N000011 HC RX IP 250 OP 636: Mod: JZ | Performed by: FAMILY MEDICINE

## 2024-01-04 PROCEDURE — 96372 THER/PROPH/DIAG INJ SC/IM: CPT | Performed by: FAMILY MEDICINE

## 2024-01-04 PROCEDURE — 250N000009 HC RX 250: Performed by: FAMILY MEDICINE

## 2024-01-04 RX ORDER — EPINEPHRINE 1 MG/ML
0.3 INJECTION, SOLUTION, CONCENTRATE INTRAVENOUS EVERY 5 MIN PRN
Status: CANCELLED | OUTPATIENT
Start: 2024-01-16

## 2024-01-04 RX ORDER — LIDOCAINE HYDROCHLORIDE 10 MG/ML
2 INJECTION, SOLUTION EPIDURAL; INFILTRATION; INTRACAUDAL; PERINEURAL ONCE
Status: COMPLETED | OUTPATIENT
Start: 2024-01-04 | End: 2024-01-04

## 2024-01-04 RX ORDER — METHYLPREDNISOLONE SODIUM SUCCINATE 125 MG/2ML
125 INJECTION, POWDER, LYOPHILIZED, FOR SOLUTION INTRAMUSCULAR; INTRAVENOUS
Status: CANCELLED
Start: 2024-01-16

## 2024-01-04 RX ORDER — LIDOCAINE HYDROCHLORIDE 10 MG/ML
2 INJECTION, SOLUTION EPIDURAL; INFILTRATION; INTRACAUDAL; PERINEURAL ONCE
Status: CANCELLED | OUTPATIENT
Start: 2024-01-16 | End: 2024-01-16

## 2024-01-04 RX ORDER — ALBUTEROL SULFATE 0.83 MG/ML
2.5 SOLUTION RESPIRATORY (INHALATION)
Status: CANCELLED | OUTPATIENT
Start: 2024-01-16

## 2024-01-04 RX ORDER — ALBUTEROL SULFATE 90 UG/1
1-2 AEROSOL, METERED RESPIRATORY (INHALATION)
Status: CANCELLED
Start: 2024-01-16

## 2024-01-04 RX ORDER — MEPERIDINE HYDROCHLORIDE 25 MG/ML
25 INJECTION INTRAMUSCULAR; INTRAVENOUS; SUBCUTANEOUS EVERY 30 MIN PRN
Status: CANCELLED | OUTPATIENT
Start: 2024-01-16

## 2024-01-04 RX ORDER — DIPHENHYDRAMINE HYDROCHLORIDE 50 MG/ML
50 INJECTION INTRAMUSCULAR; INTRAVENOUS
Status: CANCELLED
Start: 2024-01-16

## 2024-01-04 RX ADMIN — BUPRENORPHINE 100 MG: 100 SOLUTION SUBCUTANEOUS at 15:13

## 2024-01-04 RX ADMIN — LIDOCAINE HYDROCHLORIDE 2 ML: 10 INJECTION, SOLUTION EPIDURAL; INFILTRATION; INTRACAUDAL; PERINEURAL at 15:08

## 2024-01-04 ASSESSMENT — PAIN SCALES - GENERAL: PAINLEVEL: NO PAIN (0)

## 2024-01-04 NOTE — PROGRESS NOTES
Infusion Nursing Note:  Sapphire JACINTO Paulinocharles presents today for sublocade 100mg.    Patient seen by provider today: No   present during visit today: Not Applicable.    Note: Patient denies relapse or withdrawal/cravings.  No evidence of tampering noted.      Intravenous Access:  No Intravenous access/labs at this visit.    Treatment Conditions:  Not Applicable.      Post Infusion Assessment:  Patient tolerated injection without incident.       Discharge Plan:   Patient discharged in stable condition accompanied by: son  Departure Mode: Ambulatory.  Will return to clinic in 4 weeks, appointment scheduled.    Guerda Rodriguez RN

## 2024-01-12 DIAGNOSIS — E03.9 ACQUIRED HYPOTHYROIDISM: ICD-10-CM

## 2024-01-12 RX ORDER — LEVOTHYROXINE SODIUM 50 UG/1
50 TABLET ORAL DAILY
Qty: 30 TABLET | Refills: 0 | Status: SHIPPED | OUTPATIENT
Start: 2024-01-12 | End: 2024-01-30

## 2024-01-30 ENCOUNTER — VIRTUAL VISIT (OUTPATIENT)
Dept: FAMILY MEDICINE | Facility: OTHER | Age: 64
End: 2024-01-30
Payer: COMMERCIAL

## 2024-01-30 DIAGNOSIS — F11.20 OPIOID USE DISORDER, SEVERE, DEPENDENCE (H): ICD-10-CM

## 2024-01-30 DIAGNOSIS — E78.5 DYSLIPIDEMIA: ICD-10-CM

## 2024-01-30 DIAGNOSIS — F41.1 ANXIETY STATE: ICD-10-CM

## 2024-01-30 DIAGNOSIS — E55.9 VITAMIN D INSUFFICIENCY: ICD-10-CM

## 2024-01-30 DIAGNOSIS — F51.04 PSYCHOPHYSIOLOGICAL INSOMNIA: ICD-10-CM

## 2024-01-30 DIAGNOSIS — Z76.0 MEDICATION REFILL: Primary | ICD-10-CM

## 2024-01-30 DIAGNOSIS — Z12.11 SCREEN FOR COLON CANCER: ICD-10-CM

## 2024-01-30 DIAGNOSIS — E03.9 ACQUIRED HYPOTHYROIDISM: ICD-10-CM

## 2024-01-30 DIAGNOSIS — Z12.31 ENCOUNTER FOR SCREENING MAMMOGRAM FOR BREAST CANCER: ICD-10-CM

## 2024-01-30 DIAGNOSIS — F33.1 MODERATE EPISODE OF RECURRENT MAJOR DEPRESSIVE DISORDER (H): ICD-10-CM

## 2024-01-30 PROCEDURE — 99441 PR PHYSICIAN TELEPHONE EVALUATION 5-10 MIN: CPT | Mod: 93 | Performed by: STUDENT IN AN ORGANIZED HEALTH CARE EDUCATION/TRAINING PROGRAM

## 2024-01-30 RX ORDER — TRAZODONE HYDROCHLORIDE 150 MG/1
TABLET ORAL
Qty: 90 TABLET | Refills: 0 | Status: SHIPPED | OUTPATIENT
Start: 2024-01-30 | End: 2024-05-22

## 2024-01-30 RX ORDER — PAROXETINE 20 MG/1
20 TABLET, FILM COATED ORAL DAILY
Qty: 90 TABLET | Refills: 0 | Status: SHIPPED | OUTPATIENT
Start: 2024-01-30 | End: 2024-05-03

## 2024-01-30 RX ORDER — LEVOTHYROXINE SODIUM 50 UG/1
50 TABLET ORAL DAILY
Qty: 60 TABLET | Refills: 0 | Status: SHIPPED | OUTPATIENT
Start: 2024-01-30 | End: 2024-04-08

## 2024-01-30 RX ORDER — ATORVASTATIN CALCIUM 10 MG/1
10 TABLET, FILM COATED ORAL DAILY
Qty: 90 TABLET | Refills: 0 | Status: SHIPPED | OUTPATIENT
Start: 2024-01-30 | End: 2024-05-06

## 2024-01-30 RX ORDER — CHOLECALCIFEROL (VITAMIN D3) 50 MCG
1 TABLET ORAL DAILY
Qty: 90 TABLET | Refills: 0 | Status: SHIPPED | OUTPATIENT
Start: 2024-01-30 | End: 2024-05-20

## 2024-01-30 NOTE — PROGRESS NOTES
Sapphire is a 63 year old who is being evaluated via a billable telephone visit.      What phone number would you like to be contacted at? [phone]   How would you like to obtain your AVS? Mail a copy    Distant Location (provider location):  Off-site    Assessment & Plan     Medication refill  Patient recently moved to the [facility] and is requesting refills, would recommend follow-up for establish care/annual checkup with myself or any other provider at the clinic.  Short-term refills as below until establish care visit    Screen for colon cancer  Due for screening  - Colonoscopy Screening  Referral    Vitamin D insufficiency  Refills, due for lab  - Vitamin D3 50 mcg (2000 units) tablet  Dispense: 90 tablet; Refill: 0  - Vitamin D Deficiency    Psychophysiological insomnia  Refills, stable  - traZODone (DESYREL) 150 MG tablet  Dispense: 90 tablet; Refill: 0    Moderate episode of recurrent major depressive disorder (H)  Refills, stable  - PARoxetine (PAXIL) 20 MG tablet  Dispense: 90 tablet; Refill: 0    Anxiety state  Stable  - PARoxetine (PAXIL) 20 MG tablet  Dispense: 90 tablet; Refill: 0    Dyslipidemia  Due for labs  - atorvastatin (LIPITOR) 10 MG tablet  Dispense: 90 tablet; Refill: 0  - Lipid panel reflex to direct LDL Fasting    Encounter for screening mammogram for breast cancer  - *MA Screening Digital Bilateral    Acquired hypothyroidism  Due for labs  - TSH with free T4 reflex  - levothyroxine (SYNTHROID/LEVOTHROID) 50 MCG tablet  Dispense: 60 tablet; Refill: 0    Opioid use disorder, severe, dependence (H)  On Suboxone, following up with a provider in [place], currently weaning off of this and has been on it for a couple years        Subjective   Sapphire is a 63 year old, presenting for the following health issues:  Thyroid Problem and Refill Request        1/30/2024     2:41 PM   Additional Questions   Roomed by yamileth   Accompanied by self     Patient would like a refill on all  medications    History of Present Illness       Reason for visit:  Refill on all medication    She eats 0-1 servings of fruits and vegetables daily.She consumes 0 sweetened beverage(s) daily.She exercises with enough effort to increase her heart rate 9 or less minutes per day.  She exercises with enough effort to increase her heart rate 3 or less days per week.   She is taking medications regularly.             Review of Systems  Constitutional, HEENT, cardiovascular, pulmonary, gi and gu systems are negative, except as otherwise noted.      Objective           Vitals:  No vitals were obtained today due to virtual visit.    Physical Exam   General: Alert and no distress //Respiratory: No audible wheeze, cough, or shortness of breath // Psychiatric:  Appropriate affect, tone, and pace of words            Phone call duration: 9 minutes  Signed Electronically by: RENE HEBERT MD

## 2024-02-07 ENCOUNTER — TELEPHONE (OUTPATIENT)
Dept: BEHAVIORAL HEALTH | Facility: CLINIC | Age: 64
End: 2024-02-07
Payer: COMMERCIAL

## 2024-02-07 NOTE — TELEPHONE ENCOUNTER
Incoming staff message:    ----- Message from Zulma Handley DO sent at 2/7/2024 12:45 PM CST -----  Regarding: RE: Needs to be seen  Yes, she will need to be seen prior to injection.  Thank you!  ----- Message -----  From: Jocy Driscoll RN  Sent: 2/7/2024  12:24 PM CST  To: Tahmina West CNP; Zulma Handley DO  Subject: Needs to be seen                                 Pt has appt this coming Friday 2/9/24 for sublocade injection. According to her chart, she has not been seen in [facility] Clinic since 11/2/23 which is 14 weeks. It looks like she was a no show for her appt on 11/27/23. Would you like patient to be seen before she gets her injection this Friday? Or are we okay to proceed without patient being seen? Please advise.     Jocy Driscoll, BO[facility]    Writer attempted to contact patient, no answer; left VM message asking for a return call to the [facility].     Yvonne Ball, RN on 2/7/2024 at 1:08 PM
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
## 2024-02-13 ENCOUNTER — OFFICE VISIT (OUTPATIENT)
Dept: BEHAVIORAL HEALTH | Facility: CLINIC | Age: 64
End: 2024-02-13
Payer: COMMERCIAL

## 2024-02-13 ENCOUNTER — LAB (OUTPATIENT)
Dept: LAB | Facility: CLINIC | Age: 64
End: 2024-02-13
Attending: FAMILY MEDICINE
Payer: COMMERCIAL

## 2024-02-13 ENCOUNTER — INFUSION THERAPY VISIT (OUTPATIENT)
Dept: INFUSION THERAPY | Facility: CLINIC | Age: 64
End: 2024-02-13
Attending: FAMILY MEDICINE
Payer: COMMERCIAL

## 2024-02-13 VITALS — DIASTOLIC BLOOD PRESSURE: 79 MMHG | SYSTOLIC BLOOD PRESSURE: 119 MMHG | HEART RATE: 89 BPM

## 2024-02-13 DIAGNOSIS — Z79.899 ENCOUNTER FOR LONG-TERM CURRENT USE OF HIGH RISK MEDICATION: ICD-10-CM

## 2024-02-13 DIAGNOSIS — F11.20 OPIOID USE DISORDER, SEVERE, DEPENDENCE (H): Primary | ICD-10-CM

## 2024-02-13 DIAGNOSIS — Z51.81 ENCOUNTER FOR MONITORING OPIOID MAINTENANCE THERAPY: ICD-10-CM

## 2024-02-13 DIAGNOSIS — F11.20 OPIOID USE DISORDER, SEVERE, DEPENDENCE (H): ICD-10-CM

## 2024-02-13 DIAGNOSIS — Z79.891 ENCOUNTER FOR MONITORING OPIOID MAINTENANCE THERAPY: ICD-10-CM

## 2024-02-13 LAB
ALT SERPL W P-5'-P-CCNC: 25 U/L (ref 0–50)
AMPHETAMINE QUAL URINE POCT: NEGATIVE
AST SERPL W P-5'-P-CCNC: 22 U/L (ref 0–45)
BARBITURATE QUAL URINE POCT: NEGATIVE
BENZODIAZEPINE QUAL URINE POCT: NEGATIVE
BUPRENORPHINE QUAL URINE POCT: ABNORMAL
COCAINE QUAL URINE POCT: NEGATIVE
CREATININE QUAL URINE POCT: ABNORMAL
INTERNAL QC QUAL URINE POCT: ABNORMAL
MDMA QUAL URINE POCT: NEGATIVE
METHADONE QUAL URINE POCT: NEGATIVE
METHAMPHETAMINE QUAL URINE POCT: NEGATIVE
OPIATE QUAL URINE POCT: NEGATIVE
OXYCODONE QUAL URINE POCT: NEGATIVE
PH QUAL URINE POCT: ABNORMAL
PHENCYCLIDINE QUAL URINE POCT: NEGATIVE
POCT KIT EXPIRATION DATE: ABNORMAL
POCT KIT LOT NUMBER: ABNORMAL
SPECIFIC GRAVITY POCT: 1.02
TEMPERATURE URINE POCT: ABNORMAL
THC QUAL URINE POCT: NEGATIVE

## 2024-02-13 PROCEDURE — 80305 DRUG TEST PRSMV DIR OPT OBS: CPT | Performed by: FAMILY MEDICINE

## 2024-02-13 PROCEDURE — 96372 THER/PROPH/DIAG INJ SC/IM: CPT | Performed by: FAMILY MEDICINE

## 2024-02-13 PROCEDURE — 99214 OFFICE O/P EST MOD 30 MIN: CPT | Performed by: FAMILY MEDICINE

## 2024-02-13 PROCEDURE — 84460 ALANINE AMINO (ALT) (SGPT): CPT

## 2024-02-13 PROCEDURE — 250N000009 HC RX 250: Performed by: FAMILY MEDICINE

## 2024-02-13 PROCEDURE — 84450 TRANSFERASE (AST) (SGOT): CPT

## 2024-02-13 PROCEDURE — 36415 COLL VENOUS BLD VENIPUNCTURE: CPT

## 2024-02-13 PROCEDURE — 250N000011 HC RX IP 250 OP 636: Mod: JZ | Performed by: FAMILY MEDICINE

## 2024-02-13 RX ORDER — METHYLPREDNISOLONE SODIUM SUCCINATE 125 MG/2ML
125 INJECTION, POWDER, LYOPHILIZED, FOR SOLUTION INTRAMUSCULAR; INTRAVENOUS
Status: CANCELLED
Start: 2024-02-29

## 2024-02-13 RX ORDER — LIDOCAINE HYDROCHLORIDE 10 MG/ML
2 INJECTION, SOLUTION EPIDURAL; INFILTRATION; INTRACAUDAL; PERINEURAL ONCE
Status: CANCELLED | OUTPATIENT
Start: 2024-02-29 | End: 2024-02-29

## 2024-02-13 RX ORDER — ALBUTEROL SULFATE 90 UG/1
1-2 AEROSOL, METERED RESPIRATORY (INHALATION)
Status: CANCELLED
Start: 2024-02-29

## 2024-02-13 RX ORDER — MEPERIDINE HYDROCHLORIDE 25 MG/ML
25 INJECTION INTRAMUSCULAR; INTRAVENOUS; SUBCUTANEOUS EVERY 30 MIN PRN
Status: CANCELLED | OUTPATIENT
Start: 2024-02-29

## 2024-02-13 RX ORDER — BUPRENORPHINE AND NALOXONE 8; 2 MG/1; MG/1
1-2 FILM, SOLUBLE BUCCAL; SUBLINGUAL DAILY PRN
Qty: 60 FILM | Refills: 0 | Status: SHIPPED | OUTPATIENT
Start: 2024-02-13 | End: 2024-03-12

## 2024-02-13 RX ORDER — DIPHENHYDRAMINE HYDROCHLORIDE 50 MG/ML
50 INJECTION INTRAMUSCULAR; INTRAVENOUS
Status: CANCELLED
Start: 2024-02-29

## 2024-02-13 RX ORDER — LIDOCAINE HYDROCHLORIDE 10 MG/ML
2 INJECTION, SOLUTION EPIDURAL; INFILTRATION; INTRACAUDAL; PERINEURAL ONCE
Status: COMPLETED | OUTPATIENT
Start: 2024-02-13 | End: 2024-02-13

## 2024-02-13 RX ORDER — ALBUTEROL SULFATE 0.83 MG/ML
2.5 SOLUTION RESPIRATORY (INHALATION)
Status: CANCELLED | OUTPATIENT
Start: 2024-02-29

## 2024-02-13 RX ORDER — EPINEPHRINE 1 MG/ML
0.3 INJECTION, SOLUTION, CONCENTRATE INTRAVENOUS EVERY 5 MIN PRN
Status: CANCELLED | OUTPATIENT
Start: 2024-02-29

## 2024-02-13 RX ADMIN — LIDOCAINE HYDROCHLORIDE 2 ML: 10 INJECTION, SOLUTION EPIDURAL; INFILTRATION; INTRACAUDAL; PERINEURAL at 13:22

## 2024-02-13 RX ADMIN — BUPRENORPHINE 100 MG: 100 SOLUTION SUBCUTANEOUS at 13:25

## 2024-02-13 ASSESSMENT — PATIENT HEALTH QUESTIONNAIRE - PHQ9: SUM OF ALL RESPONSES TO PHQ QUESTIONS 1-9: 2

## 2024-02-13 NOTE — NURSING NOTE
[facility]      Rooming information:  Approximate last use of full opioid agonist: aprox 6 years ago   Taking buprenorphine? Yes: suboxone  How much per day? 24mg  Number of buprenorphine films/tablets remaining currently: 0  Side effects related to buprenorphine (constipation, dry mouth, sedation?) No   Narcan currently available: No  Other recent substance use:    Denies  NICOTINE-Yes:   If using nicotine, ready to quit? Yes:     Point of care urine drug screen positive for:  Lab Results   Component Value Date    BUP Screen Positive (A) 02/13/2024    BZO Negative 02/13/2024    BAR Negative 02/13/2024    THEODORA Negative 02/13/2024    MAMP Negative 02/13/2024    AMP Negative 02/13/2024    MDMA Negative 02/13/2024    MTD Negative 02/13/2024    RZQ933 Negative 02/13/2024    OXY Negative 02/13/2024    PCP Negative 02/13/2024    THC Negative 02/13/2024    TEMP 92 F 02/13/2024    SGPOCT 1.025 02/13/2024       *POC urine drug screen does not screen for Fentanyl    Pregnancy Status   LMP:   Birth control/barriers:   Interested in birth control if none currently? No  Urine pregnancy test specimen obtained and sent to lab:        2/13/2024    12:00 PM   PHQ Assesment Total Score(s)   PHQ-9 Score 2       If PHQ-9 score of 15 or higher, has [facility] therapist or provider been notified? N/A    Any current suicidal ideation? No  If yes, has [facility] therapist or provider been notified? N/A    Primary care provider: Physician No Ref-Primary     Mental health provider: none (follow up on MH referral if needed)    Insurance needs: active    Housing needs: stable     Current legal issues: none     Contact information up to date? Yes     3rd Party Involvement  (please obtain SASHA if pt would like to include)    Praveen Amador MA  February 13, 2024  12:36 PM

## 2024-02-13 NOTE — PROGRESS NOTES
Infusion Nursing Note:  Sapphire Vivar presents today for sublocade 100mg.    Patient seen by provider today: Yes: [facility]   present during visit today: Not Applicable.    Note: Pt denies opioid use. Some withdrawal symptoms managed by PO suboxone. No evidence of tampering. Lidocaine and sublocade given RLQ.       Intravenous Access:  No Intravenous access/labs at this visit.    Treatment Conditions:  Not Applicable.      Post Infusion Assessment:  Patient tolerated injection without incident.       Discharge Plan:   Discharge instructions reviewed with: Patient.  Patient and/or family verbalized understanding of discharge instructions and all questions answered.  Patient discharged in stable condition accompanied by: self.  Departure Mode: Ambulatory.      Jocy Driscoll RN
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
## 2024-02-26 DIAGNOSIS — F51.04 PSYCHOPHYSIOLOGICAL INSOMNIA: ICD-10-CM

## 2024-02-26 RX ORDER — TRAZODONE HYDROCHLORIDE 150 MG/1
TABLET ORAL
Qty: 90 TABLET | Refills: 0 | OUTPATIENT
Start: 2024-02-26

## 2024-03-12 ENCOUNTER — OFFICE VISIT (OUTPATIENT)
Dept: BEHAVIORAL HEALTH | Facility: CLINIC | Age: 64
End: 2024-03-12
Payer: COMMERCIAL

## 2024-03-12 ENCOUNTER — INFUSION THERAPY VISIT (OUTPATIENT)
Dept: INFUSION THERAPY | Facility: CLINIC | Age: 64
End: 2024-03-12
Attending: FAMILY MEDICINE
Payer: COMMERCIAL

## 2024-03-12 VITALS — DIASTOLIC BLOOD PRESSURE: 78 MMHG | SYSTOLIC BLOOD PRESSURE: 117 MMHG | HEART RATE: 85 BPM

## 2024-03-12 VITALS
DIASTOLIC BLOOD PRESSURE: 77 MMHG | OXYGEN SATURATION: 93 % | SYSTOLIC BLOOD PRESSURE: 114 MMHG | HEART RATE: 93 BPM | TEMPERATURE: 99 F | RESPIRATION RATE: 14 BRPM

## 2024-03-12 DIAGNOSIS — F11.20 OPIOID USE DISORDER, SEVERE, DEPENDENCE (H): Primary | ICD-10-CM

## 2024-03-12 DIAGNOSIS — Z79.891 ENCOUNTER FOR MONITORING OPIOID MAINTENANCE THERAPY: ICD-10-CM

## 2024-03-12 DIAGNOSIS — Z51.81 ENCOUNTER FOR MONITORING OPIOID MAINTENANCE THERAPY: ICD-10-CM

## 2024-03-12 DIAGNOSIS — F17.200 TOBACCO USE DISORDER: ICD-10-CM

## 2024-03-12 PROCEDURE — 96372 THER/PROPH/DIAG INJ SC/IM: CPT | Performed by: FAMILY MEDICINE

## 2024-03-12 PROCEDURE — 99214 OFFICE O/P EST MOD 30 MIN: CPT | Performed by: FAMILY MEDICINE

## 2024-03-12 PROCEDURE — 250N000011 HC RX IP 250 OP 636: Mod: JZ | Performed by: FAMILY MEDICINE

## 2024-03-12 PROCEDURE — 250N000009 HC RX 250: Performed by: FAMILY MEDICINE

## 2024-03-12 RX ORDER — METHYLPREDNISOLONE SODIUM SUCCINATE 125 MG/2ML
125 INJECTION, POWDER, LYOPHILIZED, FOR SOLUTION INTRAMUSCULAR; INTRAVENOUS
Status: CANCELLED
Start: 2024-04-09

## 2024-03-12 RX ORDER — BUPRENORPHINE AND NALOXONE 8; 2 MG/1; MG/1
1-2 FILM, SOLUBLE BUCCAL; SUBLINGUAL DAILY PRN
Qty: 30 FILM | Refills: 0 | Status: SHIPPED | OUTPATIENT
Start: 2024-03-12 | End: 2024-09-16

## 2024-03-12 RX ORDER — MEPERIDINE HYDROCHLORIDE 25 MG/ML
25 INJECTION INTRAMUSCULAR; INTRAVENOUS; SUBCUTANEOUS EVERY 30 MIN PRN
Status: CANCELLED | OUTPATIENT
Start: 2024-04-09

## 2024-03-12 RX ORDER — ALBUTEROL SULFATE 0.83 MG/ML
2.5 SOLUTION RESPIRATORY (INHALATION)
Status: CANCELLED | OUTPATIENT
Start: 2024-04-09

## 2024-03-12 RX ORDER — DIPHENHYDRAMINE HYDROCHLORIDE 50 MG/ML
50 INJECTION INTRAMUSCULAR; INTRAVENOUS
Status: CANCELLED
Start: 2024-04-09

## 2024-03-12 RX ORDER — LIDOCAINE HYDROCHLORIDE 10 MG/ML
2 INJECTION, SOLUTION EPIDURAL; INFILTRATION; INTRACAUDAL; PERINEURAL ONCE
Status: COMPLETED | OUTPATIENT
Start: 2024-03-12 | End: 2024-03-12

## 2024-03-12 RX ORDER — EPINEPHRINE 1 MG/ML
0.3 INJECTION, SOLUTION, CONCENTRATE INTRAVENOUS EVERY 5 MIN PRN
Status: CANCELLED | OUTPATIENT
Start: 2024-04-09

## 2024-03-12 RX ORDER — ALBUTEROL SULFATE 90 UG/1
1-2 AEROSOL, METERED RESPIRATORY (INHALATION)
Status: CANCELLED
Start: 2024-04-09

## 2024-03-12 RX ORDER — LIDOCAINE HYDROCHLORIDE 10 MG/ML
2 INJECTION, SOLUTION EPIDURAL; INFILTRATION; INTRACAUDAL; PERINEURAL ONCE
Status: CANCELLED | OUTPATIENT
Start: 2024-04-09 | End: 2024-04-09

## 2024-03-12 RX ADMIN — LIDOCAINE HYDROCHLORIDE 2 ML: 10 INJECTION, SOLUTION EPIDURAL; INFILTRATION; INTRACAUDAL; PERINEURAL at 11:36

## 2024-03-12 RX ADMIN — BUPRENORPHINE 300 MG: 300 SOLUTION SUBCUTANEOUS at 11:39

## 2024-03-12 ASSESSMENT — PAIN SCALES - GENERAL: PAINLEVEL: NO PAIN (0)

## 2024-03-12 ASSESSMENT — PATIENT HEALTH QUESTIONNAIRE - PHQ9: SUM OF ALL RESPONSES TO PHQ QUESTIONS 1-9: 2

## 2024-03-12 NOTE — NURSING NOTE
[facility]      Rooming information:  Approximate last use of full opioid agonist: Aprox 6 years ago  Taking buprenorphine? Yes: sublocade and suboxone prn  How much per day? Monthly   Number of buprenorphine films/tablets remaining currently: unsure   Side effects related to buprenorphine (constipation, dry mouth, sedation?) No   Narcan currently available: Yes  Other recent substance use:    Denies  NICOTINE-Yes:   If using nicotine, ready to quit? Yes:     Point of care urine drug screen positive for:  Lab Results   Component Value Date    BUP Screen Positive (A) 02/13/2024    BZO Negative 02/13/2024    BAR Negative 02/13/2024    THEODORA Negative 02/13/2024    MAMP Negative 02/13/2024    AMP Negative 02/13/2024    MDMA Negative 02/13/2024    MTD Negative 02/13/2024    NLH509 Negative 02/13/2024    OXY Negative 02/13/2024    PCP Negative 02/13/2024    THC Negative 02/13/2024    TEMP 92 F 02/13/2024    SGPOCT 1.025 02/13/2024       *POC urine drug screen does not screen for Fentanyl    Pregnancy Status   LMP:   Birth control/barriers:   Interested in birth control if none currently? No  Urine pregnancy test specimen obtained and sent to lab:        2/13/2024    12:00 PM   PHQ Assesment Total Score(s)   PHQ-9 Score 2       If PHQ-9 score of 15 or higher, has Recovery Clinic therapist or provider been notified? N/A    Any current suicidal ideation? No  If yes, has [facility] therapist or provider been notified? N/A    Primary care provider: Physician No Ref-Primary     Mental health provider: none (follow up on MH referral if needed)    Insurance needs: active     Housing needs: stable    Current legal issues: none    Contact information up to date? Yes     3rd Party Involvement  (please obtain SASHA if pt would like to include)    Praveen Amador MA  March 12, 2024  10:50 AM

## 2024-03-12 NOTE — PROGRESS NOTES
[facility] Follow Up    ASSESSMENT/PLAN                                                    1. Opioid use disorder, severe, dependence (H)  Pt with continuing development of withdrawal symptoms in 4th week between Sublocade injections.  She has had some disruptions in monthly dosing as noted in HPI.  In addition, she has been taking high doses of SL buprenorphine intermittently.    Sublocade 300mg today and continue this increased maintenance dose.   Limit SL buprenorphine to 8-16mg/day only as needed.    After next Sublocade 300mg, decrease SL bupe to 8mg/day max with plans to discontinue the following month.    Pt declined rx for clonidine or tizanidine to address withdrawal symptoms if they occur.   Pt confirms she has naloxone.   Repeat alt/ast and obtain HCV and HIV testing next visit.   - Drugs of Abuse Screen Urine (POC CUPS) POCT  - buprenorphine HCl-naloxone HCl (SUBOXONE) 8-2 MG per film; Place 1-2 Film under the tongue daily as needed (withdrawal symptoms or cravings)  Dispense: 30 Film; Refill: 0    2. Encounter for monitoring opioid maintenance therapy  - Drugs of Abuse Screen Urine (POC CUPS) POCT    3. Tobacco use disorder  Pt did express interest in quitting.  Discuss NRT, bupropion, varenicline next visit.       Return in about 4 weeks (around 4/9/2024).  Patient counseling completed today:  Discussed mechanism of action, potential risks/benefits/side effects of medications and other recommendations above.     Harm reduction counseling including never use alone, availability of naloxone, risks associated with concurrent use of opioids and benzodiazepines, alcohol, or other sedatives, safer administration as applicable.  Discussed importance of avoiding isolation, building a network of supportive relationships, avoiding people/places/things associated with past use to reduce risk of relapse; including motivational interviewing regarding psychosocial interventions.  "  SUBJECTIVE                                                        CC/HPI:  Sapphire Vivar is a 63 year old female with PMH PE 2022, hypothyroidism, anxiety, depression, tobacco use disorder, and opioid use disorder on buprenorphine who presents to the [facility] for follow up.      Brief History:  Sapphire Vivar was first seen in Recovery Clinic on 01/11/23. They were referred by a friend to help pt with her goal of transferring from SL buprenorphine to XR buprenorphine and eventually taper off buprenorphine.  Pt has taken 24mg buprenorphine/day since 2019.   Started Sublocade 300mg on 3/15/23, received monthly injections x5.     Lost to follow up after sublocade 7/25/23. Resumed SL suboxone from unprescribed sources during absence from clinic.   Resumed Sublocade 11/29/23  Most recent injection (#8, 100mg) was given 2/13/24.  Experiencing withdrawal symptoms in 4th week between injections.  Recommended 300mg/month maintenance starting 3/12/24.     3/12/24 visit:  Pt presents before her next Sublocade.  She states over the last month she did not experience any withdrawal symptoms or cravings until about 5 days ago.  She reports experiencing yawning, temperature dysregulation, restlessness.  She began taking 24mg SL buprenorphine/day 5 days ago to address these symptoms.   She states she accidentally left the entire rx of buprenorphine films from 2/2024 visit at the [place] in [place] where she was visiting, so she acquired the SL buprenorphine from a friend.  She reports the buprenorphine at the cabin will be brought back to her in about one month.   She expresses a lot of anxiety about being able to eventually taper off buprenorphine.    She denies cravings for opioids or return to use of any substances besides nicotine.      Substance Use History:  Opioids:   Age at first use: late 40s  Current use: substance: heroin; quantity \"a lot\"; route: insufflation ; timing of last use: 2018;  First started " buprenorphine through [facility] 2018, then began purchasing from nonprescription sources after missing an appointment due to problems arising while visiting her sister that prolonged her visit.    IV drug use: No   History of overdose: No  Previous residential or outpatient treatments for addiction : Yes: 3 times  Previous medication treatments for addiction: Yes: Suboxone  Longest period of sobriety: currently  Medical complications related to substance use: denies  Hepatitis C: negative per pt; Date of most recent testing: no record of testing; declined testing 2/13/24  HIV: negative per pt; Date of most recent testing: no record of testing     Other Addiction History:  Stimulants   Cocaine in 20s  Sedatives/hypnotics/anxiolytics:   denies  Alcohol:   denies  Nicotine:   1 pack daily  Cannabis:   denies  Hallucinogens/Dissociatives:   denies  Eating disorder:  denies  Gambling:              denies     PAST PSYCHIATRIC HISTORY:  Diagnoses- anxiety/depression  Suicide Attempts: No   Hospitalizations: No       11/2/2023    10:00 AM 2/13/2024    12:00 PM 3/12/2024    10:00 AM   PHQ   PHQ-9 Total Score 3 2 2   Q9: Thoughts of better off dead/self-harm past 2 weeks Not at all Not at all Not at all     Social History  Housing status: with son,  Whom she takes care of he has down syndrome   Employment status: Employed full time  Relationship status: Single  Children: 4 children all adults  Legal: denies       Labs discussed with patient?  Yes      [place] Prescription Drug Monitoring Program Reviewed:  Yes  02/13/2024 02/13/2024 2 Suboxone 8 Mg-2 Mg Sl Film 60.00 30 Li Vol [id number] Vamshi (9921)     Medications:  atorvastatin (LIPITOR) 10 MG tablet, Take 1 tablet (10 mg) by mouth daily  buprenorphine HCl-naloxone HCl (SUBOXONE) 8-2 MG per film, Place 1-2 Film under the tongue daily as needed (withdrawal symptoms or cravings)  levothyroxine (SYNTHROID/LEVOTHROID) 50 MCG tablet, Take 1 tablet (50 mcg) by mouth daily  naloxone  (NARCAN) 4 MG/0.1ML nasal spray, Spray 1 spray (4 mg) into one nostril alternating nostrils as needed for opioid reversal every 2-3 minutes until assistance arrives (Patient not taking: Reported on 1/30/2024)  PARoxetine (PAXIL) 20 MG tablet, Take 1 tablet (20 mg) by mouth daily  traZODone (DESYREL) 150 MG tablet, TAKE ONE TABLET BY MOUTH AT BEDTIME  Vitamin D3 50 mcg (2000 units) tablet, Take 1 tablet (50 mcg) by mouth daily    No current facility-administered medications on file prior to visit.      Allergies   Allergen Reactions    Pcn [Penicillins] Rash       PMH, PSH, FamHx reviewed      OBJECTIVE                                                      /78   Pulse 85   LMP 10/15/2007     Physical Exam  Constitutional:       Appearance: Normal appearance.   HENT:      Head: Normocephalic.   Eyes:      General: No scleral icterus.     Extraocular Movements: Extraocular movements intact.      Conjunctiva/sclera: Conjunctivae normal.   Pulmonary:      Effort: Pulmonary effort is normal.   Neurological:      Mental Status: She is alert and oriented to person, place, and time.      Coordination: Coordination is intact.      Gait: Gait is intact.   Psychiatric:         Attention and Perception: Attention and perception normal.         Mood and Affect: Mood is anxious. Affect is not inappropriate.         Speech: Speech normal.         Behavior: Behavior is cooperative.         Thought Content: Thought content normal.         Cognition and Memory: Cognition normal.         Judgment: Judgment normal.         Labs:    UDS:    Lab Results   Component Value Date    BUP Screen Positive (A) 02/13/2024    BZO Negative 02/13/2024    BAR Negative 02/13/2024    THEODORA Negative 02/13/2024    MAMP Negative 02/13/2024    AMP Negative 02/13/2024    MDMA Negative 02/13/2024    MTD Negative 02/13/2024    [id number] Negative 02/13/2024    OXY Negative 02/13/2024    PCP Negative 02/13/2024    THC Negative 02/13/2024    TEMP 92 F  02/13/2024    POCT 1.025 02/13/2024     *POC urine drug screen does not screen for Fentanyl      No results found for this or any previous visit (from the past 240 hour(s)).        Gema Zhang MD  Addiction Medicine  [place] [id number] [address] [address]  [phone]

## 2024-03-12 NOTE — PROGRESS NOTES
Infusion Nursing Note:  Sapphire JACINTO Elmermarybeth presents today for sublocade injection .    Patient seen by provider today: Yes: no changes via provider   present during visit today: Not Applicable.    Note: previous site shows no signs of tampering or infection.  This injection provided RUQ approx 2 oclock position 2 inches from umbilicus.  No recent relapses in the past 7 days and follow up appt is made. .      Intravenous Access:  No Intravenous access/labs at this visit.    Treatment Conditions:  Not Applicable.      Post Infusion Assessment:  Patient tolerated injection without incident.       Discharge Plan:   Patient and/or family verbalized understanding of discharge instructions and all questions answered.      Lizet Ying RN

## 2024-04-08 DIAGNOSIS — E03.9 ACQUIRED HYPOTHYROIDISM: ICD-10-CM

## 2024-04-08 DIAGNOSIS — E03.9 ACQUIRED HYPOTHYROIDISM: Primary | ICD-10-CM

## 2024-04-08 RX ORDER — LEVOTHYROXINE SODIUM 50 UG/1
50 TABLET ORAL DAILY
Qty: 30 TABLET | Refills: 0 | Status: SHIPPED | OUTPATIENT
Start: 2024-04-08 | End: 2024-05-20

## 2024-04-09 ENCOUNTER — INFUSION THERAPY VISIT (OUTPATIENT)
Dept: INFUSION THERAPY | Facility: CLINIC | Age: 64
End: 2024-04-09
Attending: FAMILY MEDICINE
Payer: COMMERCIAL

## 2024-04-09 VITALS — HEART RATE: 85 BPM | OXYGEN SATURATION: 94 % | DIASTOLIC BLOOD PRESSURE: 75 MMHG | SYSTOLIC BLOOD PRESSURE: 123 MMHG

## 2024-04-09 DIAGNOSIS — F11.20 OPIOID USE DISORDER, SEVERE, DEPENDENCE (H): Primary | ICD-10-CM

## 2024-04-09 PROCEDURE — 96372 THER/PROPH/DIAG INJ SC/IM: CPT | Performed by: FAMILY MEDICINE

## 2024-04-09 PROCEDURE — 250N000011 HC RX IP 250 OP 636: Mod: JZ | Performed by: FAMILY MEDICINE

## 2024-04-09 PROCEDURE — 250N000009 HC RX 250: Performed by: FAMILY MEDICINE

## 2024-04-09 RX ORDER — LIDOCAINE HYDROCHLORIDE 10 MG/ML
2 INJECTION, SOLUTION EPIDURAL; INFILTRATION; INTRACAUDAL; PERINEURAL ONCE
Status: COMPLETED | OUTPATIENT
Start: 2024-04-09 | End: 2024-04-09

## 2024-04-09 RX ORDER — ALBUTEROL SULFATE 90 UG/1
1-2 AEROSOL, METERED RESPIRATORY (INHALATION)
Start: 2024-05-07

## 2024-04-09 RX ORDER — LIDOCAINE HYDROCHLORIDE 10 MG/ML
2 INJECTION, SOLUTION EPIDURAL; INFILTRATION; INTRACAUDAL; PERINEURAL ONCE
OUTPATIENT
Start: 2024-05-07 | End: 2024-05-07

## 2024-04-09 RX ORDER — EPINEPHRINE 1 MG/ML
0.3 INJECTION, SOLUTION, CONCENTRATE INTRAVENOUS EVERY 5 MIN PRN
OUTPATIENT
Start: 2024-05-07

## 2024-04-09 RX ORDER — ALBUTEROL SULFATE 0.83 MG/ML
2.5 SOLUTION RESPIRATORY (INHALATION)
OUTPATIENT
Start: 2024-05-07

## 2024-04-09 RX ORDER — MEPERIDINE HYDROCHLORIDE 25 MG/ML
25 INJECTION INTRAMUSCULAR; INTRAVENOUS; SUBCUTANEOUS EVERY 30 MIN PRN
OUTPATIENT
Start: 2024-05-07

## 2024-04-09 RX ORDER — METHYLPREDNISOLONE SODIUM SUCCINATE 125 MG/2ML
125 INJECTION, POWDER, LYOPHILIZED, FOR SOLUTION INTRAMUSCULAR; INTRAVENOUS
Start: 2024-05-07

## 2024-04-09 RX ORDER — DIPHENHYDRAMINE HYDROCHLORIDE 50 MG/ML
50 INJECTION INTRAMUSCULAR; INTRAVENOUS
Start: 2024-05-07

## 2024-04-09 RX ADMIN — BUPRENORPHINE 300 MG: 300 SOLUTION SUBCUTANEOUS at 12:49

## 2024-04-09 RX ADMIN — LIDOCAINE HYDROCHLORIDE 2 ML: 10 INJECTION, SOLUTION EPIDURAL; INFILTRATION; INTRACAUDAL; PERINEURAL at 12:46

## 2024-04-09 NOTE — PROGRESS NOTES
Infusion Nursing Note:  Sapphire OPHELIA Bobcharles presents today for Sublocade.    Patient seen by provider today: No   present during visit today: Not Applicable.    Note: Pt denies opioid use and withdrawal. No evidence of tampering. Lidocaine and sublocade given LLQ.       Intravenous Access:  No Intravenous access/labs at this visit.    Treatment Conditions:  Not Applicable.      Post Infusion Assessment:  Patient tolerated injection without incident.       Discharge Plan:   Discharge instructions reviewed with: Patient.  Patient and/or family verbalized understanding of discharge instructions and all questions answered.  Patient discharged in stable condition accompanied by: self.  Departure Mode: Ambulatory.      Jocy Driscoll RN

## 2024-04-16 ENCOUNTER — LAB (OUTPATIENT)
Dept: LAB | Facility: OTHER | Age: 64
End: 2024-04-16
Payer: COMMERCIAL

## 2024-04-16 DIAGNOSIS — E03.9 ACQUIRED HYPOTHYROIDISM: ICD-10-CM

## 2024-04-16 LAB — TSH SERPL DL<=0.005 MIU/L-ACNC: 3.51 UIU/ML (ref 0.3–4.2)

## 2024-04-16 PROCEDURE — 84443 ASSAY THYROID STIM HORMONE: CPT

## 2024-04-16 PROCEDURE — 36415 COLL VENOUS BLD VENIPUNCTURE: CPT

## 2024-04-17 ENCOUNTER — TELEPHONE (OUTPATIENT)
Dept: FAMILY MEDICINE | Facility: OTHER | Age: 64
End: 2024-04-17
Payer: COMMERCIAL

## 2024-04-17 NOTE — TELEPHONE ENCOUNTER
Called and left voicemail for patient to give us a call back regarding lab results.      Alexandra Escobar, CMA

## 2024-04-17 NOTE — RESULT ENCOUNTER NOTE
Team - please call patient with results.  They are negative/normal, nothing else needs to be done at this time with these results unless there are questions or concerns    Thank you,    Marcel Almanza MD

## 2024-04-17 NOTE — RESULT ENCOUNTER NOTE
Sapphire,    Your results are within normal limits/negative and nothing else needs to be done at this time.       If you have any questions feel free to call the clinic at [phone].      Thank you,    Nate Almanza MD

## 2024-04-19 NOTE — TELEPHONE ENCOUNTER
"Looks like this was regarding a TSH result this was from  \"Team - please call patient with results.  They are negative/normal, nothing else needs to be done at this time with these results unless there are questions or concerns     Thank you,     Marcel Almanza MD\"  "

## 2024-05-03 DIAGNOSIS — E78.5 DYSLIPIDEMIA: ICD-10-CM

## 2024-05-03 DIAGNOSIS — F33.1 MODERATE EPISODE OF RECURRENT MAJOR DEPRESSIVE DISORDER (H): ICD-10-CM

## 2024-05-03 DIAGNOSIS — F41.1 ANXIETY STATE: ICD-10-CM

## 2024-05-03 RX ORDER — PAROXETINE 20 MG/1
20 TABLET, FILM COATED ORAL DAILY
Qty: 90 TABLET | Refills: 0 | Status: SHIPPED | OUTPATIENT
Start: 2024-05-03 | End: 2024-08-20

## 2024-05-06 RX ORDER — ATORVASTATIN CALCIUM 10 MG/1
10 TABLET, FILM COATED ORAL DAILY
Qty: 90 TABLET | Refills: 0 | Status: SHIPPED | OUTPATIENT
Start: 2024-05-06 | End: 2024-09-16

## 2024-05-17 DIAGNOSIS — E55.9 VITAMIN D INSUFFICIENCY: ICD-10-CM

## 2024-05-17 DIAGNOSIS — E03.9 ACQUIRED HYPOTHYROIDISM: ICD-10-CM

## 2024-05-20 RX ORDER — CHOLECALCIFEROL (VITAMIN D3) 50 MCG
1 TABLET ORAL DAILY
Qty: 90 TABLET | Refills: 0 | Status: SHIPPED | OUTPATIENT
Start: 2024-05-20 | End: 2024-08-19

## 2024-05-20 RX ORDER — LEVOTHYROXINE SODIUM 50 UG/1
50 TABLET ORAL DAILY
Qty: 90 TABLET | Refills: 3 | Status: SHIPPED | OUTPATIENT
Start: 2024-05-20 | End: 2024-09-16

## 2024-05-22 DIAGNOSIS — F51.04 PSYCHOPHYSIOLOGICAL INSOMNIA: ICD-10-CM

## 2024-05-22 RX ORDER — TRAZODONE HYDROCHLORIDE 150 MG/1
TABLET ORAL
Qty: 90 TABLET | Refills: 1 | Status: SHIPPED | OUTPATIENT
Start: 2024-05-22 | End: 2024-09-16

## 2024-06-24 ENCOUNTER — NURSE TRIAGE (OUTPATIENT)
Dept: FAMILY MEDICINE | Facility: OTHER | Age: 64
End: 2024-06-24
Payer: COMMERCIAL

## 2024-06-24 NOTE — TELEPHONE ENCOUNTER
Provider Response to 2nd Level Triage Request    I have reviewed the RN documentation. My recommendation is:  Face To Face Visit.  Currently scheduled date.

## 2024-06-24 NOTE — TELEPHONE ENCOUNTER
Attempt #1 to call.     RN has attempted to contact this patient by phone to return their call, but there is no response. RN left voicemail and requested return call to [facility] at [phone]    TRAVIS BellN, RN

## 2024-06-24 NOTE — TELEPHONE ENCOUNTER
Attempt #1 to call patient.     RN left voicemail and requested return call to [facility] at [phone]    Yvonne Vasques RN  [facility]

## 2024-06-24 NOTE — TELEPHONE ENCOUNTER
Pt states her symptoms of left arm and breast pain have been on and off for about 4 weeks     Denies headaches, dizziness, and weakness    Having normal BM and urine output.     Pt states she does not want to go to UC or ER but wants to see provider     RN relayed a message would be sent.     Yvonne Vasques RN     Reason for Disposition   Neurologic deficit of gradual onset (e.g., days to weeks), ANY of the following: * Weakness of the face, arm, or leg on one side of the body* Numbness of the face, arm, or leg on one side of the body* Loss of speech or garbled speech    Additional Information   Negative: Confusion, disorientation, or hallucinations is main symptom   Negative: Dizziness is main symptom   Negative: Followed a head injury within last 3 days   Negative: Difficult to awaken or acting confused (e.g., disoriented, slurred speech)   Negative: New neurologic deficit that is present NOW, sudden onset of ANY of the following: * Weakness of the face, arm, or leg on one side of the body* Numbness of the face, arm, or leg on one side of the body* Loss of speech or garbled speech   Negative: Sounds like a life-threatening emergency to the triager   Negative: SEVERE weakness (i.e., unable to walk or barely able to walk, requires support) and new-onset or worsening   Negative: Headache (with neurologic deficit)   Negative: Unable to urinate (or only a few drops) and bladder feels very full   Negative: Loss of bladder or bowel control (urine or bowel incontinence; wetting self, leaking stool) of new-onset   Negative: Back pain with numbness (loss of sensation) in groin or rectal area   Negative: Neurologic deficit that was brief (now gone), ANY of the following: * Weakness of the face, arm, or leg on one side of the body * Numbness of the face, arm, or leg on one side of the body * Loss of speech or garbled speech   Negative: Patient sounds very sick or weak to the triager    Protocols used: Neurologic  Deficit-A-OH

## 2024-06-24 NOTE — TELEPHONE ENCOUNTER
This sounds like a more chronic intermittent type of problem and I will have to wait until the 28th since I do not have a place that I can see her before then anyway.  Electronically signed:    Marecllo Robles PA-C

## 2024-06-24 NOTE — TELEPHONE ENCOUNTER
"Patient has an appointment with Marcello Robles 06/28. Her appointment note states \"pain in left arm and breast\".      Trang Taylor MA    "

## 2024-06-25 NOTE — TELEPHONE ENCOUNTER
RN Triage    Patient Contact    Attempt # 2    RN did attempt to reach patient. No answer. Message left for patient to call the clinic back and ask to speak to a triage nurse.     Fern Simmons RN on 6/25/2024 at 10:05 AM

## 2024-06-28 ENCOUNTER — OFFICE VISIT (OUTPATIENT)
Dept: FAMILY MEDICINE | Facility: OTHER | Age: 64
End: 2024-06-28
Payer: COMMERCIAL

## 2024-06-28 VITALS
DIASTOLIC BLOOD PRESSURE: 70 MMHG | WEIGHT: 177.5 LBS | RESPIRATION RATE: 16 BRPM | BODY MASS INDEX: 32.47 KG/M2 | TEMPERATURE: 97.5 F | OXYGEN SATURATION: 94 % | HEART RATE: 68 BPM | SYSTOLIC BLOOD PRESSURE: 120 MMHG

## 2024-06-28 DIAGNOSIS — Z12.31 ENCOUNTER FOR SCREENING MAMMOGRAM FOR BREAST CANCER: ICD-10-CM

## 2024-06-28 DIAGNOSIS — M79.622 PAIN OF LEFT UPPER ARM: Primary | ICD-10-CM

## 2024-06-28 DIAGNOSIS — J96.01 ACUTE RESPIRATORY FAILURE WITH HYPOXIA (H): ICD-10-CM

## 2024-06-28 DIAGNOSIS — S29.011A STRAIN OF LEFT PECTORALIS MUSCLE, INITIAL ENCOUNTER: ICD-10-CM

## 2024-06-28 DIAGNOSIS — I26.99 BILATERAL PULMONARY EMBOLISM (H): ICD-10-CM

## 2024-06-28 DIAGNOSIS — Z12.11 SPECIAL SCREENING FOR MALIGNANT NEOPLASMS, COLON: ICD-10-CM

## 2024-06-28 PROCEDURE — 99213 OFFICE O/P EST LOW 20 MIN: CPT | Performed by: PHYSICIAN ASSISTANT

## 2024-06-28 ASSESSMENT — PAIN SCALES - GENERAL: PAINLEVEL: NO PAIN (0)

## 2024-06-28 NOTE — PROGRESS NOTES
"  Assessment & Plan     Strain of left pectoralis muscle, initial encounter  Pain of left upper arm  Simple muscle strain is discussed with the patient today.  Further evaluation could be done with physical therapy if she wishes but she declines this today.  Follow-up with primary care provider in the near future is encouraged if this is not completely resolved.  Signs symptoms are minimal today by report.    Encounter for screening mammogram for breast cancer  Special screening for malignant neoplasms, colon  - Colonoscopy [26469]; Future    Bilateral pulmonary embolism (H)  Acute respiratory failure with hypoxia (H)  Historically noted with no new concerns.  Follow-up with primary care provider should be arranged in the near future.          BMI  Estimated body mass index is 32.47 kg/m  as calculated from the following:    Height as of 9/23/23: 1.575 m (5' 2\").    Weight as of this encounter: 80.5 kg (177 lb 8 oz).   Weight management plan: Patient was referred to their PCP to discuss a diet and exercise plan.      Work on weight loss  Regular exercise    Raysa Leary is a 63 year old, presenting for the following health issues:  No chief complaint on file.    History of Present Illness       Reason for visit:  Soreness under arms and down arms  Symptom onset:  More than a month  Symptoms include:  Soreness  Symptom intensity:  Moderate  Symptom progression:  Staying the same  What makes it worse:  No  What makes it better:  No    She eats 0-1 servings of fruits and vegetables daily.She consumes 0 sweetened beverage(s) daily.She exercises with enough effort to increase her heart rate 20 to 29 minutes per day.  She exercises with enough effort to increase her heart rate 5 days per week.   She is taking medications regularly.         Review of Systems  Constitutional, HEENT, cardiovascular, pulmonary, GI, , musculoskeletal, neuro, skin, endocrine and psych systems are negative, except as otherwise noted.    "   Objective    /70   Pulse 68   Temp 97.5  F (36.4  C) (Temporal)   Resp 16   Wt 80.5 kg (177 lb 8 oz)   LMP 10/15/2007   SpO2 94%   BMI 32.47 kg/m    Body mass index is 32.47 kg/m .  Physical Exam   GENERAL: alert and no distress  RESP: lungs clear to auscultation - no rales, rhonchi or wheezes  CV: regular rate and rhythm, normal S1 S2, no S3 or S4, no murmur, click or rub, no peripheral edema  MS: Tenderness to the left pectoralis muscle as it relates to the left upper arm is noted upon exam and range of motion today.  Internal/external rotation is maintained.  Sensation is maintained.  Findings consistent with muscle strain are discussed with the patient.  SKIN: no suspicious lesions or rashes to exposed visible skin today  NEURO: Normal strength and tone, mentation intact and speech normal  PSYCH: mentation appears normal, affect normal/bright          Signed Electronically by: Marcello Ramey PA-C

## 2024-08-16 DIAGNOSIS — F33.1 MODERATE EPISODE OF RECURRENT MAJOR DEPRESSIVE DISORDER (H): ICD-10-CM

## 2024-08-16 DIAGNOSIS — E55.9 VITAMIN D INSUFFICIENCY: ICD-10-CM

## 2024-08-16 DIAGNOSIS — F41.1 ANXIETY STATE: ICD-10-CM

## 2024-08-16 DIAGNOSIS — E78.5 DYSLIPIDEMIA: ICD-10-CM

## 2024-08-19 RX ORDER — CHOLECALCIFEROL (VITAMIN D3) 50 MCG
1 TABLET ORAL DAILY
Qty: 90 TABLET | Refills: 0 | Status: SHIPPED | OUTPATIENT
Start: 2024-08-19

## 2024-08-19 RX ORDER — PAROXETINE 20 MG/1
20 TABLET, FILM COATED ORAL DAILY
Qty: 90 TABLET | Refills: 0 | OUTPATIENT
Start: 2024-08-19

## 2024-08-19 RX ORDER — ATORVASTATIN CALCIUM 10 MG/1
10 TABLET, FILM COATED ORAL DAILY
Qty: 90 TABLET | Refills: 0 | OUTPATIENT
Start: 2024-08-19

## 2024-08-19 NOTE — TELEPHONE ENCOUNTER
Looks like she is due white related labs and visit.  Needs to establish care with PCP of choice.  No further refills without office visit. Electronically signed: Marcello Robles PA-C

## 2024-08-20 RX ORDER — PAROXETINE 20 MG/1
20 TABLET, FILM COATED ORAL DAILY
Qty: 30 TABLET | Refills: 0 | Status: SHIPPED | OUTPATIENT
Start: 2024-08-20 | End: 2024-09-16

## 2024-08-20 NOTE — TELEPHONE ENCOUNTER
"Patient has appointment 9/16/24.  She has been without Paxil x 3 days and starting to experience withdrawal symptoms that she describes as \"head spinning\". Has had these type of symptoms in the past when she has been without Paxil.     Routing to provider if able to provide short refill to get patient to upcoming appointment date.     Deirdre ROBLESN, RN   "

## 2024-09-16 ENCOUNTER — OFFICE VISIT (OUTPATIENT)
Dept: FAMILY MEDICINE | Facility: OTHER | Age: 64
End: 2024-09-16
Payer: COMMERCIAL

## 2024-09-16 VITALS
HEART RATE: 84 BPM | TEMPERATURE: 96.8 F | SYSTOLIC BLOOD PRESSURE: 107 MMHG | DIASTOLIC BLOOD PRESSURE: 71 MMHG | OXYGEN SATURATION: 95 % | HEIGHT: 62 IN | WEIGHT: 176 LBS | RESPIRATION RATE: 13 BRPM | BODY MASS INDEX: 32.39 KG/M2

## 2024-09-16 DIAGNOSIS — E78.5 HYPERLIPIDEMIA LDL GOAL <100: ICD-10-CM

## 2024-09-16 DIAGNOSIS — Z12.31 ENCOUNTER FOR SCREENING MAMMOGRAM FOR BREAST CANCER: ICD-10-CM

## 2024-09-16 DIAGNOSIS — F41.1 ANXIETY STATE: ICD-10-CM

## 2024-09-16 DIAGNOSIS — F33.1 MODERATE EPISODE OF RECURRENT MAJOR DEPRESSIVE DISORDER (H): ICD-10-CM

## 2024-09-16 DIAGNOSIS — Z00.00 ROUTINE HISTORY AND PHYSICAL EXAMINATION OF ADULT: Primary | ICD-10-CM

## 2024-09-16 DIAGNOSIS — F17.200 NICOTINE DEPENDENCE, UNCOMPLICATED, UNSPECIFIED NICOTINE PRODUCT TYPE: ICD-10-CM

## 2024-09-16 DIAGNOSIS — E03.9 ACQUIRED HYPOTHYROIDISM: ICD-10-CM

## 2024-09-16 DIAGNOSIS — F51.04 PSYCHOPHYSIOLOGICAL INSOMNIA: ICD-10-CM

## 2024-09-16 PROBLEM — I26.99 BILATERAL PULMONARY EMBOLISM (H): Status: RESOLVED | Noted: 2022-03-08 | Resolved: 2024-09-16

## 2024-09-16 PROBLEM — J96.01 ACUTE RESPIRATORY FAILURE WITH HYPOXIA (H): Status: RESOLVED | Noted: 2022-02-22 | Resolved: 2024-09-16

## 2024-09-16 LAB
ALBUMIN SERPL BCG-MCNC: 4.2 G/DL (ref 3.5–5.2)
ALP SERPL-CCNC: 110 U/L (ref 40–150)
ALT SERPL W P-5'-P-CCNC: 24 U/L (ref 0–50)
ANION GAP SERPL CALCULATED.3IONS-SCNC: 12 MMOL/L (ref 7–15)
AST SERPL W P-5'-P-CCNC: 22 U/L (ref 0–45)
BILIRUB SERPL-MCNC: 0.2 MG/DL
BUN SERPL-MCNC: 12.4 MG/DL (ref 8–23)
CALCIUM SERPL-MCNC: 9.4 MG/DL (ref 8.8–10.4)
CHLORIDE SERPL-SCNC: 100 MMOL/L (ref 98–107)
CHOLEST SERPL-MCNC: 226 MG/DL
CREAT SERPL-MCNC: 0.91 MG/DL (ref 0.51–0.95)
EGFRCR SERPLBLD CKD-EPI 2021: 71 ML/MIN/1.73M2
ERYTHROCYTE [DISTWIDTH] IN BLOOD BY AUTOMATED COUNT: 13 % (ref 10–15)
FASTING STATUS PATIENT QL REPORTED: YES
FASTING STATUS PATIENT QL REPORTED: YES
GLUCOSE SERPL-MCNC: 107 MG/DL (ref 70–99)
HCO3 SERPL-SCNC: 24 MMOL/L (ref 22–29)
HCT VFR BLD AUTO: 43.2 % (ref 35–47)
HDLC SERPL-MCNC: 28 MG/DL
HGB BLD-MCNC: 14.1 G/DL (ref 11.7–15.7)
LDLC SERPL CALC-MCNC: 151 MG/DL
MCH RBC QN AUTO: 27.7 PG (ref 26.5–33)
MCHC RBC AUTO-ENTMCNC: 32.6 G/DL (ref 31.5–36.5)
MCV RBC AUTO: 85 FL (ref 78–100)
NONHDLC SERPL-MCNC: 198 MG/DL
PLATELET # BLD AUTO: 301 10E3/UL (ref 150–450)
POTASSIUM SERPL-SCNC: 4.6 MMOL/L (ref 3.4–5.3)
PROT SERPL-MCNC: 7.3 G/DL (ref 6.4–8.3)
RBC # BLD AUTO: 5.09 10E6/UL (ref 3.8–5.2)
SODIUM SERPL-SCNC: 136 MMOL/L (ref 135–145)
TRIGL SERPL-MCNC: 234 MG/DL
TSH SERPL DL<=0.005 MIU/L-ACNC: 2.03 UIU/ML (ref 0.3–4.2)
WBC # BLD AUTO: 8.6 10E3/UL (ref 4–11)

## 2024-09-16 PROCEDURE — 85027 COMPLETE CBC AUTOMATED: CPT | Performed by: PHYSICIAN ASSISTANT

## 2024-09-16 PROCEDURE — 80053 COMPREHEN METABOLIC PANEL: CPT | Performed by: PHYSICIAN ASSISTANT

## 2024-09-16 PROCEDURE — 90471 IMMUNIZATION ADMIN: CPT | Performed by: PHYSICIAN ASSISTANT

## 2024-09-16 PROCEDURE — 90750 HZV VACC RECOMBINANT IM: CPT | Performed by: PHYSICIAN ASSISTANT

## 2024-09-16 PROCEDURE — 99214 OFFICE O/P EST MOD 30 MIN: CPT | Mod: 25 | Performed by: PHYSICIAN ASSISTANT

## 2024-09-16 PROCEDURE — 84443 ASSAY THYROID STIM HORMONE: CPT | Performed by: PHYSICIAN ASSISTANT

## 2024-09-16 PROCEDURE — 80061 LIPID PANEL: CPT | Performed by: PHYSICIAN ASSISTANT

## 2024-09-16 PROCEDURE — 36415 COLL VENOUS BLD VENIPUNCTURE: CPT | Performed by: PHYSICIAN ASSISTANT

## 2024-09-16 RX ORDER — TRAZODONE HYDROCHLORIDE 150 MG/1
TABLET ORAL
Qty: 90 TABLET | Refills: 3 | Status: SHIPPED | OUTPATIENT
Start: 2024-09-16

## 2024-09-16 RX ORDER — ATORVASTATIN CALCIUM 10 MG/1
10 TABLET, FILM COATED ORAL DAILY
Qty: 90 TABLET | Refills: 3 | Status: SHIPPED | OUTPATIENT
Start: 2024-09-16

## 2024-09-16 RX ORDER — PAROXETINE 20 MG/1
20 TABLET, FILM COATED ORAL DAILY
Qty: 90 TABLET | Refills: 3 | Status: SHIPPED | OUTPATIENT
Start: 2024-09-16

## 2024-09-16 RX ORDER — LEVOTHYROXINE SODIUM 50 UG/1
50 TABLET ORAL DAILY
Qty: 90 TABLET | Refills: 3 | Status: SHIPPED | OUTPATIENT
Start: 2024-09-16

## 2024-09-16 ASSESSMENT — ANXIETY QUESTIONNAIRES
GAD7 TOTAL SCORE: 0
8. IF YOU CHECKED OFF ANY PROBLEMS, HOW DIFFICULT HAVE THESE MADE IT FOR YOU TO DO YOUR WORK, TAKE CARE OF THINGS AT HOME, OR GET ALONG WITH OTHER PEOPLE?: NOT DIFFICULT AT ALL
GAD7 TOTAL SCORE: 0
GAD7 TOTAL SCORE: 0
7. FEELING AFRAID AS IF SOMETHING AWFUL MIGHT HAPPEN: NOT AT ALL

## 2024-09-16 ASSESSMENT — PATIENT HEALTH QUESTIONNAIRE - PHQ9
10. IF YOU CHECKED OFF ANY PROBLEMS, HOW DIFFICULT HAVE THESE PROBLEMS MADE IT FOR YOU TO DO YOUR WORK, TAKE CARE OF THINGS AT HOME, OR GET ALONG WITH OTHER PEOPLE: SOMEWHAT DIFFICULT
SUM OF ALL RESPONSES TO PHQ QUESTIONS 1-9: 2
SUM OF ALL RESPONSES TO PHQ QUESTIONS 1-9: 2

## 2024-09-16 ASSESSMENT — PAIN SCALES - GENERAL: PAINLEVEL: NO PAIN (0)

## 2024-09-16 NOTE — PROGRESS NOTES
Assessment & Plan     Routine history and physical examination of adult  64-year-old female here for routine physical.  Related labs pending.  Follow-up based on results.  - CBC with platelets; Future  - Comprehensive metabolic panel (BMP + Alb, Alk Phos, ALT, AST, Total. Bili, TP); Future  - Lipid panel reflex to direct LDL Fasting; Future  - TSH with free T4 reflex; Future    Acquired hypothyroidism  Related labs pending.  Refilled medications as requested.  Adjust medications based on results.  Follow-up in 6 months.  - levothyroxine (SYNTHROID/LEVOTHROID) 50 MCG tablet; Take 1 tablet (50 mcg) by mouth daily.    Moderate episode of recurrent major depressive disorder (H)  Stable based on report.  Refilled medications as requested.  Follow-up in 6 months encouraged.  - PARoxetine (PAXIL) 20 MG tablet; Take 1 tablet (20 mg) by mouth daily.    Anxiety state  Follow-up in 6 months.  - PARoxetine (PAXIL) 20 MG tablet; Take 1 tablet (20 mg) by mouth daily.    Psychophysiological insomnia  Stable at this point in time with no new concerns.  Follow-up in 6 months  - traZODone (DESYREL) 150 MG tablet; TAKE ONE TABLET BY MOUTH AT BEDTIME    Hyperlipidemia LDL goal <100  - atorvastatin (LIPITOR) 10 MG tablet; Take 1 tablet (10 mg) by mouth daily.    Nicotine dependence, uncomplicated, unspecified nicotine product type  - [place] Quit Partner Referral; Future  - CT Chest Lung Cancer Scrn Low Dose wo; Future    Encounter for screening mammogram for breast cancer  - [place] Screen Bilateral w/Vin; Future    Work on weight loss  Regular exercise    Raysa Leary is a 64 year old, presenting for the following health issues:  Recheck Medication      9/16/2024     1:07 PM   Additional Questions   Roomed by yamileth   Accompanied by self     History of Present Illness       Mental Health Follow-up:  Patient presents to follow-up on Anxiety.    Patient's anxiety since last visit has been:  Good  The patient is not having other  "symptoms associated with anxiety.  Any significant life events: No  Patient is not feeling anxious or having panic attacks.  Patient has no concerns about alcohol or drug use.    She eats 0-1 servings of fruits and vegetables daily.She consumes 0 sweetened beverage(s) daily.She exercises with enough effort to increase her heart rate 9 or less minutes per day.  She exercises with enough effort to increase her heart rate 3 or less days per week.   She is taking medications regularly.         Hyperlipidemia Follow-Up    Are you regularly taking any medication or supplement to lower your cholesterol?   Yes-    Are you having muscle aches or other side effects that you think could be caused by your cholesterol lowering medication?  No    Hypothyroidism Follow-up    Since last visit, patient describes the following symptoms: weight gain of 20 lbs and constipation  How many servings of fruits and vegetables do you eat daily?  0-1  On average, how many sweetened beverages do you drink each day (Examples: soda, juice, sweet tea, etc.  Do NOT count diet or artificially sweetened beverages)?   3  How many days per week do you exercise enough to make your heart beat faster? 3 or less  How many minutes a day do you exercise enough to make your heart beat faster? 9 or less  How many days per week do you miss taking your medication? 0        Review of Systems  Constitutional, HEENT, cardiovascular, pulmonary, GI, , musculoskeletal, neuro, skin, endocrine and psych systems are negative, except as otherwise noted.      Objective    /71   Pulse 84   Temp 96.8  F (36  C) (Temporal)   Resp 13   Ht 1.575 m (5' 2\")   Wt 79.8 kg (176 lb)   LMP 10/15/2007   SpO2 95%   BMI 32.19 kg/m    Body mass index is 32.19 kg/m .  Physical Exam   GENERAL: alert and no distress  EYES: Eyes grossly normal to inspection, PERRL and conjunctivae and sclerae normal  HENT: ear canals and TM's normal, nose and mouth without ulcers or " lesions  NECK: no adenopathy, no asymmetry, masses, or scars  RESP: lungs clear to auscultation - no rales, rhonchi or wheezes  CV: regular rate and rhythm, normal S1 S2, no S3 or S4, no murmur, click or rub, no peripheral edema  ABDOMEN: soft, nontender, no hepatosplenomegaly, no masses and bowel sounds normal  MS: no gross musculoskeletal defects noted, no edema  SKIN: no suspicious lesions or rashes  NEURO: Normal strength and tone, mentation intact and speech normal  PSYCH: mentation appears normal, affect normal/bright    No results found for this or any previous visit (from the past 24 hour(s)).        Signed Electronically by: Marcello Ramey PA-C

## 2024-11-13 NOTE — PROGRESS NOTES
"Infusion Nursing Note:  Sapphire Vivar presents today for sublocade 300 mg injection.    Patient seen by provider today: No   present during visit today: Not Applicable.    Note: patient believed her dose had been changed to 100mg.  Per MD note, the plan is to do this \"eventually\".  Patient agreed to 300mg dose today but will reach out to her provider regarding this.  [facility] message sent by this writer to Dr. Zhang.  [facility] number provided to patient.      Intravenous Access:  No Intravenous access/labs at this visit.    Treatment Conditions:  Denies relapse.  No S/S infection or tampering at previous injection sites.      Post Infusion Assessment:  Patient tolerated injection without incident.  Given in LLQ after 2ml subcutaneous xylocaine administered for local anesthesia.       Discharge Plan:   Patient discharged in stable condition accompanied by: son.  Departure Mode: Ambulatory.  RTC 8/22/23.      Renate Porter    " Detail Level: Zone Minoxidil 5% Topical Foam Recommendations: Rogaine twice daily to dry scalp Detail Level: Detailed Detail Level: Generalized

## 2024-11-21 DIAGNOSIS — E55.9 VITAMIN D INSUFFICIENCY: ICD-10-CM

## 2024-11-21 RX ORDER — CHOLECALCIFEROL (VITAMIN D3) 50 MCG
1 TABLET ORAL DAILY
Qty: 90 TABLET | Refills: 2 | Status: SHIPPED | OUTPATIENT
Start: 2024-11-21

## 2024-11-29 PROBLEM — K59.01 SLOW TRANSIT CONSTIPATION: Status: ACTIVE | Noted: 2024-11-29

## 2024-11-29 PROBLEM — H25.011 CORTICAL AGE-RELATED CATARACT OF RIGHT EYE: Status: ACTIVE | Noted: 2024-11-29

## 2025-03-03 ENCOUNTER — TELEPHONE (OUTPATIENT)
Dept: FAMILY MEDICINE | Facility: OTHER | Age: 65
End: 2025-03-03
Payer: COMMERCIAL

## 2025-03-03 NOTE — TELEPHONE ENCOUNTER
RN called and relayed results message, questions answered and patient verbalized understanding.     Jacque Ackerman, RN, BSN

## 2025-03-03 NOTE — TELEPHONE ENCOUNTER
Please call the patient:  -Liver and gallbladder tests (ALT,AST, Alk phos,bilirubin) are normal.  -Mild elevation in creatinine which shows a dehydration status. Please push fluids and plan to repeat result in 3 months or discuss with your PCP.  -Kidney function (GFR) is normal.  -Sodium is normal.  -Potassium is normal.  -Calcium is normal.  -Glucose is mildly elevated but you were nonfasting and this is okay.  -BNP is within normal limits, Heart failure less likely.  -CRP shows mild inflammation marker elevation which may correspond with upper respiratory infection symptoms for a couple of weeks.  For additional lab test information, labtestsonline.org is an excellent reference.

## 2025-08-18 ENCOUNTER — PATIENT OUTREACH (OUTPATIENT)
Dept: CARE COORDINATION | Facility: CLINIC | Age: 65
End: 2025-08-18
Payer: COMMERCIAL

## 2025-08-27 ENCOUNTER — TELEPHONE (OUTPATIENT)
Dept: FAMILY MEDICINE | Facility: OTHER | Age: 65
End: 2025-08-27
Payer: COMMERCIAL

## 2025-09-01 ENCOUNTER — PATIENT OUTREACH (OUTPATIENT)
Dept: CARE COORDINATION | Facility: CLINIC | Age: 65
End: 2025-09-01
Payer: COMMERCIAL

## (undated) RX ORDER — LIDOCAINE HYDROCHLORIDE 10 MG/ML
INJECTION, SOLUTION EPIDURAL; INFILTRATION; INTRACAUDAL; PERINEURAL
Status: DISPENSED
Start: 2023-06-20

## (undated) RX ORDER — LIDOCAINE HYDROCHLORIDE 10 MG/ML
INJECTION, SOLUTION EPIDURAL; INFILTRATION; INTRACAUDAL; PERINEURAL
Status: DISPENSED
Start: 2024-01-04

## (undated) RX ORDER — LIDOCAINE HYDROCHLORIDE 10 MG/ML
INJECTION, SOLUTION EPIDURAL; INFILTRATION; INTRACAUDAL; PERINEURAL
Status: DISPENSED
Start: 2023-03-15

## (undated) RX ORDER — LIDOCAINE HYDROCHLORIDE 10 MG/ML
INJECTION, SOLUTION EPIDURAL; INFILTRATION; INTRACAUDAL; PERINEURAL
Status: DISPENSED
Start: 2024-02-13

## (undated) RX ORDER — LIDOCAINE HYDROCHLORIDE 10 MG/ML
INJECTION, SOLUTION EPIDURAL; INFILTRATION; INTRACAUDAL; PERINEURAL
Status: DISPENSED
Start: 2024-04-09

## (undated) RX ORDER — LIDOCAINE HYDROCHLORIDE 10 MG/ML
INJECTION, SOLUTION EPIDURAL; INFILTRATION; INTRACAUDAL; PERINEURAL
Status: DISPENSED
Start: 2023-07-25

## (undated) RX ORDER — LIDOCAINE HYDROCHLORIDE 10 MG/ML
INJECTION, SOLUTION EPIDURAL; INFILTRATION; INTRACAUDAL; PERINEURAL
Status: DISPENSED
Start: 2023-11-29

## (undated) RX ORDER — LIDOCAINE HYDROCHLORIDE 10 MG/ML
INJECTION, SOLUTION EPIDURAL; INFILTRATION; INTRACAUDAL; PERINEURAL
Status: DISPENSED
Start: 2023-05-12

## (undated) RX ORDER — LIDOCAINE HYDROCHLORIDE 10 MG/ML
INJECTION, SOLUTION EPIDURAL; INFILTRATION; INTRACAUDAL; PERINEURAL
Status: DISPENSED
Start: 2024-03-12

## (undated) RX ORDER — LIDOCAINE HYDROCHLORIDE 10 MG/ML
INJECTION, SOLUTION EPIDURAL; INFILTRATION; INTRACAUDAL; PERINEURAL
Status: DISPENSED
Start: 2023-04-12